# Patient Record
Sex: MALE | Race: OTHER | URBAN - METROPOLITAN AREA
[De-identification: names, ages, dates, MRNs, and addresses within clinical notes are randomized per-mention and may not be internally consistent; named-entity substitution may affect disease eponyms.]

---

## 2020-06-26 ENCOUNTER — APPOINTMENT (RX ONLY)
Dept: URBAN - METROPOLITAN AREA CLINIC 4 | Facility: CLINIC | Age: 75
Setting detail: DERMATOLOGY
End: 2020-06-26

## 2020-06-26 PROBLEM — C44.92 SQUAMOUS CELL CARCINOMA OF SKIN, UNSPECIFIED: Status: ACTIVE | Noted: 2020-06-26

## 2020-06-26 PROCEDURE — ? MOHS SURGERY PHONE CONSULTATION

## 2020-06-26 NOTE — PROCEDURE: MOHS SURGERY PHONE CONSULTATION
Time Of Mohs Surgery: 9am
Does The Patient Have A Pacemaker Or Defibrillator?: No
Pathology Accession #: [id number]
Patient Preferred Phone Number: [phone]
Which Antibiotic Do They Take For Surgical Prophylaxis?: Amoxicillin (2 grams)
Date Of Mohs Surgery: 1945
Patient's Insurance: 
Referring Provider: Bernardino Eid
Detail Level: Simple
Office Location Of Mohs Surgery: [facility]
Patient Reported Location: right jawline
Has The Pathology Report Been Received?: Yes

## 2020-07-02 ENCOUNTER — APPOINTMENT (RX ONLY)
Dept: URBAN - METROPOLITAN AREA CLINIC 22 | Facility: CLINIC | Age: 75
Setting detail: DERMATOLOGY
End: 2020-07-02

## 2020-07-02 PROBLEM — C44.329 SQUAMOUS CELL CARCINOMA OF SKIN OF OTHER PARTS OF FACE: Status: ACTIVE | Noted: 2020-07-02

## 2020-07-02 PROCEDURE — 14040 TIS TRNFR F/C/C/M/N/A/G/H/F: CPT

## 2020-07-02 PROCEDURE — ? MOHS SURGERY

## 2020-07-02 PROCEDURE — ? DIAGNOSIS COMMENT

## 2020-07-02 PROCEDURE — 17311 MOHS 1 STAGE H/N/HF/G: CPT

## 2020-07-02 PROCEDURE — ? REFERRAL CORRESPONDENCE

## 2020-07-02 PROCEDURE — 17312 MOHS ADDL STAGE: CPT

## 2020-07-02 NOTE — HPI: PROCEDURE (MOHS)
Has The Growth Been Previously Biopsied?: has been previously biopsied
Body Location Override (Optional): Right Jawline
Year Removed: 20 years ago
Location: Left upper arm

## 2020-07-02 NOTE — PROCEDURE: MOHS SURGERY
Body Location Override (Optional - Billing Will Still Be Based On Selected Body Map Location If Applicable): right jawline
Mohs Case Number: [id number]
Previous Accession (Optional): [id number]
Biopsy Photograph Reviewed: Yes
Referring Physician (Optional): Bernardino Eid
Consent Type: Consent 1 (Standard)
Eye Shield Used: No
Surgeon Performing Repair (Optional): Hussein Teague M.D.
Initial Size Of Lesion: 0.9
Number Of Stages: 2
Primary Defect Length In Cm (Final Defect Size - Required For Flaps/Grafts): 1.8
Primary Defect Width In Cm (Final Defect Size - Required For Flaps/Grafts): 1.5
Repair Type: Flap
Oculoplastic Surgeon Procedure Text (A): After obtaining clear surgical margins the patient was sent to oculoplastics for surgical repair.  The patient understands they will receive post-surgical care and follow-up from the referring physician's office.
Otolaryngologist Procedure Text (A): After obtaining clear surgical margins the patient was sent to otolaryngology for surgical repair.  The patient understands they will receive post-surgical care and follow-up from the referring physician's office.
Plastic Surgeon Procedure Text (A): After obtaining clear surgical margins the patient was sent to plastics for surgical repair.  The patient understands they will receive post-surgical care and follow-up from the referring physician's office.
Mid-Level Procedure Text (A): After obtaining clear surgical margins the patient was sent to a mid-level provider for surgical repair.  The patient understands they will receive post-surgical care and follow-up from the mid-level provider.
Provider Procedure Text (A): After obtaining clear surgical margins the defect was repaired by another provider.
Asc Procedure Text (A): After obtaining clear surgical margins the patient was sent to an ASC for surgical repair.  The patient understands they will receive post-surgical care and follow-up from the ASC physician.
Suturegard Retention Suture: 2-0 Nylon
Retention Suture Bite Size: 3 mm
Length To Time In Minutes Device Was In Place: 10
Number Of Hemigard Strips Per Side: 1
Simple / Intermediate / Complex Repair - Final Wound Length In Cm: 0
Undermining Type: Entire Wound
Debridement Text: The wound edges were debrided prior to proceeding with the closure to facilitate wound healing.
Helical Rim Text: The closure involved the helical rim.
Vermilion Border Text: The closure involved the vermilion border.
Nostril Rim Text: The closure involved the nostril rim.
Retention Suture Text: Retention sutures were placed to support the closure and prevent dehiscence.
Flap Type: A-T Advancement Flap
Secondary Defect Width In Cm (Required For Flaps): 0.2
Area H Indication Text: Tumors in this location are included in Area H (eyelids, eyebrows, nose, lips, chin, ear, pre-auricular, post-auricular, temple, genitalia, hands, feet, ankles and areola).  Tissue conservation is critical in these anatomic locations.
Area M Indication Text: Tumors in this location are included in Area M (cheek, forehead, scalp, neck, jawline and pretibial skin).  Mohs surgery is indicated for tumors in these anatomic locations.
Area L Indication Text: Tumors in this location are included in Area L (trunk and extremities).  Mohs surgery is indicated for larger tumors, or tumors with aggressive histologic features, in these anatomic locations.
Tumor Debulked?: curette
Depth Of Tumor Invasion (For Histology): epidermis
Surgical Defect Width In Cm (Optional): 1.4
Special Stains Stage 1 - Results: Base On Clearance Noted Above
Stage 2: Additional Anesthesia Type: 1% lidocaine with epinephrine and a 1:10 solution of 8.4% sodium bicarbonate
Stage 4: Additional Anesthesia Type: 1% lidocaine with epinephrine
Include Tumor Staging In Mohs Note?: Please Select the Appropriate Response
Staging Info: By selecting yes to the question above you will include information on AJCC 8 tumor staging in your Mohs note. Information on tumor staging will be automatically added for SCCs on the head and neck. AJCC 8 includes tumor size, tumor depth, perineural involvement and bone invasion.
Tumor Depth: Less than 6mm from granular layer and no invasion beyond the subcutaneous fat
Medical Necessity Statement: Based on my medical judgement, Mohs surgery is the most appropriate treatment for this cancer compared to other treatments.
Alternatives Discussed Intro (Do Not Add Period): I discussed alternative treatments to Mohs surgery and specifically discussed the risks and benefits of
Consent 1/Introductory Paragraph: The rationale for Mohs was explained to the patient and consent was obtained. The risks, benefits and alternatives to therapy were discussed in detail. Specifically, the risks of infection, scarring, bleeding, prolonged wound healing, incomplete removal, allergy to anesthesia, nerve injury and recurrence were addressed. Prior to the procedure, the treatment site was clearly identified and confirmed by the patient. All components of Universal Protocol/PAUSE Rule completed.
Consent 2/Introductory Paragraph: Mohs surgery was explained to the patient and consent was obtained. The risks, benefits and alternatives to therapy were discussed in detail. Specifically, the risks of infection, scarring, bleeding, prolonged wound healing, incomplete removal, allergy to anesthesia, nerve injury and recurrence were addressed. Prior to the procedure, the treatment site was clearly identified and confirmed by the patient. All components of Universal Protocol/PAUSE Rule completed.
Consent 3/Introductory Paragraph: I gave the patient a chance to ask questions they had about the procedure.  Following this I explained the Mohs procedure and consent was obtained. The risks, benefits and alternatives to therapy were discussed in detail. Specifically, the risks of infection, scarring, bleeding, prolonged wound healing, incomplete removal, allergy to anesthesia, nerve injury and recurrence were addressed. Prior to the procedure, the treatment site was clearly identified and confirmed by the patient. All components of Universal Protocol/PAUSE Rule completed.
Consent (Temporal Branch)/Introductory Paragraph: The rationale for Mohs was explained to the patient and consent was obtained. The risks, benefits and alternatives to therapy were discussed in detail. Specifically, the risks of damage to the temporal branch of the facial nerve, infection, scarring, bleeding, prolonged wound healing, incomplete removal, allergy to anesthesia, and recurrence were addressed. Prior to the procedure, the treatment site was clearly identified and confirmed by the patient. All components of Universal Protocol/PAUSE Rule completed.
Consent (Marginal Mandibular)/Introductory Paragraph: The rationale for Mohs was explained to the patient and consent was obtained. The risks, benefits and alternatives to therapy were discussed in detail. Specifically, the risks of damage to the marginal mandibular branch of the facial nerve, infection, scarring, bleeding, prolonged wound healing, incomplete removal, allergy to anesthesia, and recurrence were addressed. Prior to the procedure, the treatment site was clearly identified and confirmed by the patient. All components of Universal Protocol/PAUSE Rule completed.
Consent (Spinal Accessory)/Introductory Paragraph: The rationale for Mohs was explained to the patient and consent was obtained. The risks, benefits and alternatives to therapy were discussed in detail. Specifically, the risks of damage to the spinal accessory nerve, infection, scarring, bleeding, prolonged wound healing, incomplete removal, allergy to anesthesia, and recurrence were addressed. Prior to the procedure, the treatment site was clearly identified and confirmed by the patient. All components of Universal Protocol/PAUSE Rule completed.
Consent (Near Eyelid Margin)/Introductory Paragraph: The rationale for Mohs was explained to the patient and consent was obtained. The risks, benefits and alternatives to therapy were discussed in detail. Specifically, the risks of ectropion or eyelid deformity, infection, scarring, bleeding, prolonged wound healing, incomplete removal, allergy to anesthesia, nerve injury and recurrence were addressed. Prior to the procedure, the treatment site was clearly identified and confirmed by the patient. All components of Universal Protocol/PAUSE Rule completed.
Consent (Ear)/Introductory Paragraph: The rationale for Mohs was explained to the patient and consent was obtained. The risks, benefits and alternatives to therapy were discussed in detail. Specifically, the risks of ear deformity, infection, scarring, bleeding, prolonged wound healing, incomplete removal, allergy to anesthesia, nerve injury and recurrence were addressed. Prior to the procedure, the treatment site was clearly identified and confirmed by the patient. All components of Universal Protocol/PAUSE Rule completed.
Consent (Nose)/Introductory Paragraph: The rationale for Mohs was explained to the patient and consent was obtained. The risks, benefits and alternatives to therapy were discussed in detail. Specifically, the risks of nasal deformity, changes in the flow of air through the nose, infection, scarring, bleeding, prolonged wound healing, incomplete removal, allergy to anesthesia, nerve injury and recurrence were addressed. Prior to the procedure, the treatment site was clearly identified and confirmed by the patient. All components of Universal Protocol/PAUSE Rule completed.
Consent (Lip)/Introductory Paragraph: The rationale for Mohs was explained to the patient and consent was obtained. The risks, benefits and alternatives to therapy were discussed in detail. Specifically, the risks of lip deformity, changes in the oral aperture, infection, scarring, bleeding, prolonged wound healing, incomplete removal, allergy to anesthesia, nerve injury and recurrence were addressed. Prior to the procedure, the treatment site was clearly identified and confirmed by the patient. All components of Universal Protocol/PAUSE Rule completed.
Consent (Scalp)/Introductory Paragraph: The rationale for Mohs was explained to the patient and consent was obtained. The risks, benefits and alternatives to therapy were discussed in detail. Specifically, the risks of changes in hair growth pattern secondary to repair, infection, scarring, bleeding, prolonged wound healing, incomplete removal, allergy to anesthesia, nerve injury and recurrence were addressed. Prior to the procedure, the treatment site was clearly identified and confirmed by the patient. All components of Universal Protocol/PAUSE Rule completed.
Detail Level: Detailed
Postop Diagnosis: same
Anesthesia Volume In Cc: 12
Additional Anesthesia Volume In Cc: 6
Hemostasis: Electricator
Estimated Blood Loss (Cc): minimal
Repair Anesthesia Method: local infiltration
Anesthesia Volume In Cc: 3
Undermining Location (Optional): in the deep fat
Deep Sutures: 4-0 Maxon
Epidermal Sutures: 5-0 Surgipro
Epidermal Closure: running and interrupted
Suturegard Intro: Intraoperative tissue expansion was performed, utilizing the SUTUREGARD device, in order to reduce wound tension.
Suturegard Body: The suture ends were repeatedly re-tightened and re-clamped to achieve the desired tissue expansion.
Hemigard Intro: Due to skin fragility and wound tension, it was decided to use HEMIGARD adhesive retention suture devices to permit a linear closure. The skin was cleaned and dried for a 6cm distance away from the wound. Excessive hair, if present, was removed to allow for adhesion.
Hemigard Postcare Instructions: The HEMIGARD strips are to remain completely dry for at least 5-7 days.
Donor Site Anesthesia Type: same as repair anesthesia
Epidermal Closure Graft Donor Site (Optional): simple interrupted
Graft Donor Site Bandage (Optional-Leave Blank If You Don't Want In Note): Steri-strips and a pressure bandage were applied to the donor site.
Closure 2 Information: This tab is for additional flaps and grafts, including complex repair and grafts and complex repair and flaps. You can also specify a different location for the additional defect, if the location is the same you do not need to select a new one. We will insert the automated text for the repair you select below just as we do for solitary flaps and grafts. Please note that at this time if you select a location with a different insurance zone you will need to override the ICD10 and CPT if appropriate.
Closure 3 Information: This tab is for additional flaps and grafts above and beyond our usual structured repairs.  Please note if you enter information here it will not currently bill and you will need to add the billing information manually.
Wound Care: Petrolatum
Dressing: pressure dressing with telfa
Dressing (No Sutures): dry sterile dressing
Suture Removal: 7 days
Unna Boot Text: An Unna boot was placed to help immobilize the limb and facilitate more rapid healing.
Home Suture Removal Text: Patient was provided instructions on removing sutures and will remove their sutures at home.  If they have any questions or difficulties they will call the office.
Post-Care Instructions: I reviewed with the patient in detail post-care instructions. Patient is not to engage in any heavy lifting, exercise, or swimming for the next 14 days. Should the patient develop any fevers, chills, bleeding, severe pain patient will contact the office immediately.
Pain Refusal Text: I offered to prescribe pain medication but the patient refused to take this medication.
Mauc Instructions: By selecting yes to the question below the MAUC number will be added into the note.  This will be calculated automatically based on the diagnosis chosen, the size entered, the body zone selected (H,M,L) and the specific indications you chose. You will also have the option to override the Mohs AUC if you disagree with the automatically calculated number and this option is found in the Case Summary tab.
Where Do You Want The Question To Include Opioid Counseling Located?: Case Summary Tab
Eye Protection Verbiage: Before proceeding with the stage, a plastic scleral shield was inserted. The globe was anesthetized with a few drops of 1% lidocaine with 1:100,000 epinephrine. Then, an appropriate sized scleral shield was chosen and coated with lacrilube ointment. The shield was gently inserted and left in place for the duration of each stage. After the stage was completed, the shield was gently removed.
Mohs Method Verbiage: An incision at a 45 degree angle following the standard Mohs approach was done and the specimen was harvested as a microscopic controlled layer.
Surgeon/Pathologist Verbiage (Will Incorporate Name Of Surgeon From Intro If Not Blank): operated in two distinct and integrated capacities as the surgeon and pathologist.
Mohs Histo Method Verbiage: Each section was then chromacoded and processed in the Mohs lab using the Mohs protocol and submitted for frozen section.
Subsequent Stages Histo Method Verbiage: Using a similar technique to that described above, a thin layer of tissue was removed from all areas where tumor was visible on the previous stage.  The tissue was again oriented, mapped, dyed, and processed as above.
Mohs Rapid Report Verbiage: The area of clinically evident tumor was marked with skin marking ink and appropriately hatched.  The initial incision was made following the Mohs approach through the skin.  The specimen was taken to the lab, divided into the necessary number of pieces, chromacoded and processed according to the Mohs protocol.  This was repeated in successive stages until a tumor free defect was achieved.
Complex Repair Preamble Text (Leave Blank If You Do Not Want): Extensive wide undermining was performed.
Intermediate Repair Preamble Text (Leave Blank If You Do Not Want): Undermining was performed with blunt dissection.
Non-Graft Cartilage Fenestration Text: The cartilage was fenestrated with a 2mm punch biopsy to help facilitate healing.
Graft Cartilage Fenestration Text: The cartilage was fenestrated with a 2mm punch biopsy to help facilitate graft survival and healing.
Secondary Intention Text (Leave Blank If You Do Not Want): The defect will heal with secondary intention.
No Repair - Repaired With Adjacent Surgical Defect Text (Leave Blank If You Do Not Want): After obtaining clear surgical margins the defect was repaired concurrently with another surgical defect which was in close approximation.
Advancement Flap (Single) Text: The defect edges were debeveled with a #15 scalpel blade.  Given the location of the defect and the proximity to free margins a single advancement flap was deemed most appropriate.  Using a sterile surgical marker, an appropriate advancement flap was drawn incorporating the defect and placing the expected incisions within the relaxed skin tension lines where possible.    The area thus outlined was incised deep to adipose tissue with a #15 scalpel blade.  The skin margins were undermined to an appropriate distance in all directions utilizing iris scissors.
Advancement Flap (Double) Text: The defect edges were debeveled with a #15 scalpel blade.  Given the location of the defect and the proximity to free margins a double advancement flap was deemed most appropriate.  Using a sterile surgical marker, the appropriate advancement flaps were drawn incorporating the defect and placing the expected incisions within the relaxed skin tension lines where possible.    The area thus outlined was incised deep to adipose tissue with a #15 scalpel blade.  The skin margins were undermined to an appropriate distance in all directions utilizing iris scissors.
Burow's Advancement Flap Text: The defect edges were debeveled with a #15 scalpel blade.  Given the location of the defect and the proximity to free margins a Burow's advancement flap was deemed most appropriate.  Using a sterile surgical marker, the appropriate advancement flap was drawn incorporating the defect and placing the expected incisions within the relaxed skin tension lines where possible.    The area thus outlined was incised deep to adipose tissue with a #15 scalpel blade.  The skin margins were undermined to an appropriate distance in all directions utilizing iris scissors.
Chonodrocutaneous Helical Advancement Flap Text: The defect edges were debeveled with a #15 scalpel blade.  Given the location of the defect and the proximity to free margins a chondrocutaneous helical advancement flap was deemed most appropriate.  Using a sterile surgical marker, the appropriate advancement flap was drawn incorporating the defect and placing the expected incisions within the relaxed skin tension lines where possible.    The area thus outlined was incised deep to adipose tissue with a #15 scalpel blade.  The skin margins were undermined to an appropriate distance in all directions utilizing iris scissors.
Crescentic Advancement Flap Text: The defect edges were debeveled with a #15 scalpel blade.  Given the location of the defect and the proximity to free margins a crescentic advancement flap was deemed most appropriate.  Using a sterile surgical marker, the appropriate advancement flap was drawn incorporating the defect and placing the expected incisions within the relaxed skin tension lines where possible.    The area thus outlined was incised deep to adipose tissue with a #15 scalpel blade.  The skin margins were undermined to an appropriate distance in all directions utilizing iris scissors.
A-T Advancement Flap Text: The defect edges were debeveled with a #15 scalpel blade.  Given the location of the defect, shape of the defect and the proximity to free margins an A-T advancement flap was deemed most appropriate.  Using a sterile surgical marker, an appropriate advancement flap was drawn incorporating the defect and placing the expected incisions within the relaxed skin tension lines where possible.    The area thus outlined was incised deep to adipose tissue with a #15 scalpel blade.  The skin margins were undermined to an appropriate distance in all directions utilizing iris scissors.
O-T Advancement Flap Text: The defect edges were debeveled with a #15 scalpel blade.  Given the location of the defect, shape of the defect and the proximity to free margins an O-T advancement flap was deemed most appropriate.  Using a sterile surgical marker, an appropriate advancement flap was drawn incorporating the defect and placing the expected incisions within the relaxed skin tension lines where possible.    The area thus outlined was incised deep to adipose tissue with a #15 scalpel blade.  The skin margins were undermined to an appropriate distance in all directions utilizing iris scissors.
O-L Flap Text: The defect edges were debeveled with a #15 scalpel blade.  Given the location of the defect, shape of the defect and the proximity to free margins an O-L flap was deemed most appropriate.  Using a sterile surgical marker, an appropriate advancement flap was drawn incorporating the defect and placing the expected incisions within the relaxed skin tension lines where possible.    The area thus outlined was incised deep to adipose tissue with a #15 scalpel blade.  The skin margins were undermined to an appropriate distance in all directions utilizing iris scissors.
O-Z Flap Text: The defect edges were debeveled with a #15 scalpel blade.  Given the location of the defect, shape of the defect and the proximity to free margins an O-Z flap was deemed most appropriate.  Using a sterile surgical marker, an appropriate transposition flap was drawn incorporating the defect and placing the expected incisions within the relaxed skin tension lines where possible. The area thus outlined was incised deep to adipose tissue with a #15 scalpel blade.  The skin margins were undermined to an appropriate distance in all directions utilizing iris scissors.
Double O-Z Flap Text: The defect edges were debeveled with a #15 scalpel blade.  Given the location of the defect, shape of the defect and the proximity to free margins a Double O-Z flap was deemed most appropriate.  Using a sterile surgical marker, an appropriate transposition flap was drawn incorporating the defect and placing the expected incisions within the relaxed skin tension lines where possible. The area thus outlined was incised deep to adipose tissue with a #15 scalpel blade.  The skin margins were undermined to an appropriate distance in all directions utilizing iris scissors.
V-Y Flap Text: The defect edges were debeveled with a #15 scalpel blade.  Given the location of the defect, shape of the defect and the proximity to free margins a V-Y flap was deemed most appropriate.  Using a sterile surgical marker, an appropriate advancement flap was drawn incorporating the defect and placing the expected incisions within the relaxed skin tension lines where possible.    The area thus outlined was incised deep to adipose tissue with a #15 scalpel blade.  The skin margins were undermined to an appropriate distance in all directions utilizing iris scissors.
Advancement-Rotation Flap Text: The defect edges were debeveled with a #15 scalpel blade.  Given the location of the defect, shape of the defect and the proximity to free margins an advancement-rotation flap was deemed most appropriate.  Using a sterile surgical marker, an appropriate flap was drawn incorporating the defect and placing the expected incisions within the relaxed skin tension lines where possible. The area thus outlined was incised deep to adipose tissue with a #15 scalpel blade.  The skin margins were undermined to an appropriate distance in all directions utilizing iris scissors.
Mercedes Flap Text: The defect edges were debeveled with a #15 scalpel blade.  Given the location of the defect, shape of the defect and the proximity to free margins a Mercedes flap was deemed most appropriate.  Using a sterile surgical marker, an appropriate advancement flap was drawn incorporating the defect and placing the expected incisions within the relaxed skin tension lines where possible. The area thus outlined was incised deep to adipose tissue with a #15 scalpel blade.  The skin margins were undermined to an appropriate distance in all directions utilizing iris scissors.
Modified Advancement Flap Text: The defect edges were debeveled with a #15 scalpel blade.  Given the location of the defect, shape of the defect and the proximity to free margins a modified advancement flap was deemed most appropriate.  Using a sterile surgical marker, an appropriate advancement flap was drawn incorporating the defect and placing the expected incisions within the relaxed skin tension lines where possible.    The area thus outlined was incised deep to adipose tissue with a #15 scalpel blade.  The skin margins were undermined to an appropriate distance in all directions utilizing iris scissors.
Mucosal Advancement Flap Text: Given the location of the defect, shape of the defect and the proximity to free margins a mucosal advancement flap was deemed most appropriate. Incisions were made with a 15 blade scalpel in the appropriate fashion along the cutaneous vermilion border and the mucosal lip. The remaining actinically damaged mucosal tissue was excised.  The mucosal advancement flap was then elevated to the gingival sulcus with care taken to preserve the neurovascular structures and advanced into the primary defect. Care was taken to ensure that precise realignment of the vermilion border was achieved.
Peng Advancement Flap Text: The defect edges were debeveled with a #15 scalpel blade.  Given the location of the defect, shape of the defect and the proximity to free margins a Peng advancement flap was deemed most appropriate.  Using a sterile surgical marker, an appropriate advancement flap was drawn incorporating the defect and placing the expected incisions within the relaxed skin tension lines where possible. The area thus outlined was incised deep to adipose tissue with a #15 scalpel blade.  The skin margins were undermined to an appropriate distance in all directions utilizing iris scissors.
Hatchet Flap Text: The defect edges were debeveled with a #15 scalpel blade.  Given the location of the defect, shape of the defect and the proximity to free margins a hatchet flap was deemed most appropriate.  Using a sterile surgical marker, an appropriate hatchet flap was drawn incorporating the defect and placing the expected incisions within the relaxed skin tension lines where possible.    The area thus outlined was incised deep to adipose tissue with a #15 scalpel blade.  The skin margins were undermined to an appropriate distance in all directions utilizing iris scissors.
Rotation Flap Text: The defect edges were debeveled with a #15 scalpel blade.  Given the location of the defect, shape of the defect and the proximity to free margins a rotation flap was deemed most appropriate.  Using a sterile surgical marker, an appropriate rotation flap was drawn incorporating the defect and placing the expected incisions within the relaxed skin tension lines where possible.    The area thus outlined was incised deep to adipose tissue with a #15 scalpel blade.  The skin margins were undermined to an appropriate distance in all directions utilizing iris scissors.
Spiral Flap Text: The defect edges were debeveled with a #15 scalpel blade.  Given the location of the defect, shape of the defect and the proximity to free margins a spiral flap was deemed most appropriate.  Using a sterile surgical marker, an appropriate rotation flap was drawn incorporating the defect and placing the expected incisions within the relaxed skin tension lines where possible. The area thus outlined was incised deep to adipose tissue with a #15 scalpel blade.  The skin margins were undermined to an appropriate distance in all directions utilizing iris scissors.
Star Wedge Flap Text: The defect edges were debeveled with a #15 scalpel blade.  Given the location of the defect, shape of the defect and the proximity to free margins a star wedge flap was deemed most appropriate.  Using a sterile surgical marker, an appropriate rotation flap was drawn incorporating the defect and placing the expected incisions within the relaxed skin tension lines where possible. The area thus outlined was incised deep to adipose tissue with a #15 scalpel blade.  The skin margins were undermined to an appropriate distance in all directions utilizing iris scissors.
Transposition Flap Text: The defect edges were debeveled with a #15 scalpel blade.  Given the location of the defect and the proximity to free margins a transposition flap was deemed most appropriate.  Using a sterile surgical marker, an appropriate transposition flap was drawn incorporating the defect.    The area thus outlined was incised deep to adipose tissue with a #15 scalpel blade.  The skin margins were undermined to an appropriate distance in all directions utilizing iris scissors.
Muscle Hinge Flap Text: The defect edges were debeveled with a #15 scalpel blade.  Given the size, depth and location of the defect and the proximity to free margins a muscle hinge flap was deemed most appropriate.  Using a sterile surgical marker, an appropriate hinge flap was drawn incorporating the defect. The area thus outlined was incised with a #15 scalpel blade.  The skin margins were undermined to an appropriate distance in all directions utilizing iris scissors.
Melolabial Transposition Flap Text: The defect edges were debeveled with a #15 scalpel blade.  Given the location of the defect and the proximity to free margins a melolabial flap was deemed most appropriate.  Using a sterile surgical marker, an appropriate melolabial transposition flap was drawn incorporating the defect.    The area thus outlined was incised deep to adipose tissue with a #15 scalpel blade.  The skin margins were undermined to an appropriate distance in all directions utilizing iris scissors.
Rhombic Flap Text: The defect edges were debeveled with a #15 scalpel blade.  Given the location of the defect and the proximity to free margins a rhombic flap was deemed most appropriate.  Using a sterile surgical marker, an appropriate rhombic flap was drawn incorporating the defect.    The area thus outlined was incised deep to adipose tissue with a #15 scalpel blade.  The skin margins were undermined to an appropriate distance in all directions utilizing iris scissors.
Rhomboid Transposition Flap Text: The defect edges were debeveled with a #15 scalpel blade.  Given the location of the defect and the proximity to free margins a rhomboid transposition flap was deemed most appropriate.  Using a sterile surgical marker, an appropriate rhomboid flap was drawn incorporating the defect.    The area thus outlined was incised deep to adipose tissue with a #15 scalpel blade.  The skin margins were undermined to an appropriate distance in all directions utilizing iris scissors.
Bi-Rhombic Flap Text: The defect edges were debeveled with a #15 scalpel blade.  Given the location of the defect and the proximity to free margins a bi-rhombic flap was deemed most appropriate.  Using a sterile surgical marker, an appropriate rhombic flap was drawn incorporating the defect. The area thus outlined was incised deep to adipose tissue with a #15 scalpel blade.  The skin margins were undermined to an appropriate distance in all directions utilizing iris scissors.
Helical Rim Advancement Flap Text: The defect edges were debeveled with a #15 blade scalpel.  Given the location of the defect and the proximity to free margins (helical rim) a double helical rim advancement flap was deemed most appropriate.  Using a sterile surgical marker, the appropriate advancement flaps were drawn incorporating the defect and placing the expected incisions between the helical rim and antihelix where possible.  The area thus outlined was incised through and through with a #15 scalpel blade.  With a skin hook and iris scissors, the flaps were gently and sharply undermined and freed up.
Bilateral Helical Rim Advancement Flap Text: The defect edges were debeveled with a #15 blade scalpel.  Given the location of the defect and the proximity to free margins (helical rim) a bilateral helical rim advancement flap was deemed most appropriate.  Using a sterile surgical marker, the appropriate advancement flaps were drawn incorporating the defect and placing the expected incisions between the helical rim and antihelix where possible.  The area thus outlined was incised through and through with a #15 scalpel blade.  With a skin hook and iris scissors, the flaps were gently and sharply undermined and freed up.
Ear Star Wedge Flap Text: The defect edges were debeveled with a #15 blade scalpel.  Given the location of the defect and the proximity to free margins (helical rim) an ear star wedge flap was deemed most appropriate.  Using a sterile surgical marker, the appropriate flap was drawn incorporating the defect and placing the expected incisions between the helical rim and antihelix where possible.  The area thus outlined was incised through and through with a #15 scalpel blade.
Banner Transposition Flap Text: The defect edges were debeveled with a #15 scalpel blade.  Given the location of the defect and the proximity to free margins a Banner transposition flap was deemed most appropriate.  Using a sterile surgical marker, an appropriate flap drawn around the defect. The area thus outlined was incised deep to adipose tissue with a #15 scalpel blade.  The skin margins were undermined to an appropriate distance in all directions utilizing iris scissors.
Bilobed Flap Text: The defect edges were debeveled with a #15 scalpel blade.  Given the location of the defect and the proximity to free margins a bilobe flap was deemed most appropriate.  Using a sterile surgical marker, an appropriate bilobe flap drawn around the defect.    The area thus outlined was incised deep to adipose tissue with a #15 scalpel blade.  The skin margins were undermined to an appropriate distance in all directions utilizing iris scissors.
Bilobed Transposition Flap Text: The defect edges were debeveled with a #15 scalpel blade.  Given the location of the defect and the proximity to free margins a bilobed transposition flap was deemed most appropriate.  Using a sterile surgical marker, an appropriate bilobe flap drawn around the defect.    The area thus outlined was incised deep to adipose tissue with a #15 scalpel blade.  The skin margins were undermined to an appropriate distance in all directions utilizing iris scissors.
Trilobed Flap Text: The defect edges were debeveled with a #15 scalpel blade.  Given the location of the defect and the proximity to free margins a trilobed flap was deemed most appropriate.  Using a sterile surgical marker, an appropriate trilobed flap drawn around the defect.    The area thus outlined was incised deep to adipose tissue with a #15 scalpel blade.  The skin margins were undermined to an appropriate distance in all directions utilizing iris scissors.
Dorsal Nasal Flap Text: The defect edges were debeveled with a #15 scalpel blade.  Given the location of the defect and the proximity to free margins a dorsal nasal flap was deemed most appropriate.  Using a sterile surgical marker, an appropriate dorsal nasal flap was drawn around the defect.    The area thus outlined was incised deep to adipose tissue with a #15 scalpel blade.  The skin margins were undermined to an appropriate distance in all directions utilizing iris scissors.
Island Pedicle Flap Text: The defect edges were debeveled with a #15 scalpel blade.  Given the location of the defect, shape of the defect and the proximity to free margins an island pedicle advancement flap was deemed most appropriate.  Using a sterile surgical marker, an appropriate advancement flap was drawn incorporating the defect, outlining the appropriate donor tissue and placing the expected incisions within the relaxed skin tension lines where possible.    The area thus outlined was incised deep to adipose tissue with a #15 scalpel blade.  The skin margins were undermined to an appropriate distance in all directions around the primary defect and laterally outward around the island pedicle utilizing iris scissors.  There was minimal undermining beneath the pedicle flap.
Island Pedicle Flap With Canthal Suspension Text: The defect edges were debeveled with a #15 scalpel blade.  Given the location of the defect, shape of the defect and the proximity to free margins an island pedicle advancement flap was deemed most appropriate.  Using a sterile surgical marker, an appropriate advancement flap was drawn incorporating the defect, outlining the appropriate donor tissue and placing the expected incisions within the relaxed skin tension lines where possible. The area thus outlined was incised deep to adipose tissue with a #15 scalpel blade.  The skin margins were undermined to an appropriate distance in all directions around the primary defect and laterally outward around the island pedicle utilizing iris scissors.  There was minimal undermining beneath the pedicle flap. A suspension suture was placed in the canthal tendon to prevent tension and prevent ectropion.
Alar Island Pedicle Flap Text: The defect edges were debeveled with a #15 scalpel blade.  Given the location of the defect, shape of the defect and the proximity to the alar rim an island pedicle advancement flap was deemed most appropriate.  Using a sterile surgical marker, an appropriate advancement flap was drawn incorporating the defect, outlining the appropriate donor tissue and placing the expected incisions within the nasal ala running parallel to the alar rim. The area thus outlined was incised with a #15 scalpel blade.  The skin margins were undermined minimally to an appropriate distance in all directions around the primary defect and laterally outward around the island pedicle utilizing iris scissors.  There was minimal undermining beneath the pedicle flap.
Double Island Pedicle Flap Text: The defect edges were debeveled with a #15 scalpel blade.  Given the location of the defect, shape of the defect and the proximity to free margins a double island pedicle advancement flap was deemed most appropriate.  Using a sterile surgical marker, an appropriate advancement flap was drawn incorporating the defect, outlining the appropriate donor tissue and placing the expected incisions within the relaxed skin tension lines where possible.    The area thus outlined was incised deep to adipose tissue with a #15 scalpel blade.  The skin margins were undermined to an appropriate distance in all directions around the primary defect and laterally outward around the island pedicle utilizing iris scissors.  There was minimal undermining beneath the pedicle flap.
Island Pedicle Flap-Requiring Vessel Identification Text: The defect edges were debeveled with a #15 scalpel blade.  Given the location of the defect, shape of the defect and the proximity to free margins an island pedicle advancement flap was deemed most appropriate.  Using a sterile surgical marker, an appropriate advancement flap was drawn, based on the axial vessel mentioned above, incorporating the defect, outlining the appropriate donor tissue and placing the expected incisions within the relaxed skin tension lines where possible.    The area thus outlined was incised deep to adipose tissue with a #15 scalpel blade.  The skin margins were undermined to an appropriate distance in all directions around the primary defect and laterally outward around the island pedicle utilizing iris scissors.  There was minimal undermining beneath the pedicle flap.
Keystone Flap Text: The defect edges were debeveled with a #15 scalpel blade.  Given the location of the defect, shape of the defect a keystone flap was deemed most appropriate.  Using a sterile surgical marker, an appropriate keystone flap was drawn incorporating the defect, outlining the appropriate donor tissue and placing the expected incisions within the relaxed skin tension lines where possible. The area thus outlined was incised deep to adipose tissue with a #15 scalpel blade.  The skin margins were undermined to an appropriate distance in all directions around the primary defect and laterally outward around the flap utilizing iris scissors.
O-T Plasty Text: The defect edges were debeveled with a #15 scalpel blade.  Given the location of the defect, shape of the defect and the proximity to free margins an O-T plasty was deemed most appropriate.  Using a sterile surgical marker, an appropriate O-T plasty was drawn incorporating the defect and placing the expected incisions within the relaxed skin tension lines where possible.    The area thus outlined was incised deep to adipose tissue with a #15 scalpel blade.  The skin margins were undermined to an appropriate distance in all directions utilizing iris scissors.
O-Z Plasty Text: The defect edges were debeveled with a #15 scalpel blade.  Given the location of the defect, shape of the defect and the proximity to free margins an O-Z plasty (double transposition flap) was deemed most appropriate.  Using a sterile surgical marker, the appropriate transposition flaps were drawn incorporating the defect and placing the expected incisions within the relaxed skin tension lines where possible.    The area thus outlined was incised deep to adipose tissue with a #15 scalpel blade.  The skin margins were undermined to an appropriate distance in all directions utilizing iris scissors.  Hemostasis was achieved with electrocautery.  The flaps were then transposed into place, one clockwise and the other counterclockwise, and anchored with interrupted buried subcutaneous sutures.
Double O-Z Plasty Text: The defect edges were debeveled with a #15 scalpel blade.  Given the location of the defect, shape of the defect and the proximity to free margins a Double O-Z plasty (double transposition flap) was deemed most appropriate.  Using a sterile surgical marker, the appropriate transposition flaps were drawn incorporating the defect and placing the expected incisions within the relaxed skin tension lines where possible. The area thus outlined was incised deep to adipose tissue with a #15 scalpel blade.  The skin margins were undermined to an appropriate distance in all directions utilizing iris scissors.  Hemostasis was achieved with electrocautery.  The flaps were then transposed into place, one clockwise and the other counterclockwise, and anchored with interrupted buried subcutaneous sutures.
V-Y Plasty Text: The defect edges were debeveled with a #15 scalpel blade.  Given the location of the defect, shape of the defect and the proximity to free margins an V-Y advancement flap was deemed most appropriate.  Using a sterile surgical marker, an appropriate advancement flap was drawn incorporating the defect and placing the expected incisions within the relaxed skin tension lines where possible.    The area thus outlined was incised deep to adipose tissue with a #15 scalpel blade.  The skin margins were undermined to an appropriate distance in all directions utilizing iris scissors.
H Plasty Text: Given the location of the defect, shape of the defect and the proximity to free margins a H-plasty was deemed most appropriate for repair.  Using a sterile surgical marker, the appropriate advancement arms of the H-plasty were drawn incorporating the defect and placing the expected incisions within the relaxed skin tension lines where possible. The area thus outlined was incised deep to adipose tissue with a #15 scalpel blade. The skin margins were undermined to an appropriate distance in all directions utilizing iris scissors.  The opposing advancement arms were then advanced into place in opposite direction and anchored with interrupted buried subcutaneous sutures.
W Plasty Text: The lesion was extirpated to the level of the fat with a #15 scalpel blade.  Given the location of the defect, shape of the defect and the proximity to free margins a W-plasty was deemed most appropriate for repair.  Using a sterile surgical marker, the appropriate transposition arms of the W-plasty were drawn incorporating the defect and placing the expected incisions within the relaxed skin tension lines where possible.    The area thus outlined was incised deep to adipose tissue with a #15 scalpel blade.  The skin margins were undermined to an appropriate distance in all directions utilizing iris scissors.  The opposing transposition arms were then transposed into place in opposite direction and anchored with interrupted buried subcutaneous sutures.
Z Plasty Text: The lesion was extirpated to the level of the fat with a #15 scalpel blade.  Given the location of the defect, shape of the defect and the proximity to free margins a Z-plasty was deemed most appropriate for repair.  Using a sterile surgical marker, the appropriate transposition arms of the Z-plasty were drawn incorporating the defect and placing the expected incisions within the relaxed skin tension lines where possible.    The area thus outlined was incised deep to adipose tissue with a #15 scalpel blade.  The skin margins were undermined to an appropriate distance in all directions utilizing iris scissors.  The opposing transposition arms were then transposed into place in opposite direction and anchored with interrupted buried subcutaneous sutures.
Cheek Interpolation Flap Text: A decision was made to reconstruct the defect utilizing an interpolation axial flap and a staged reconstruction.  A telfa template was made of the defect.  This telfa template was then used to outline the Cheek Interpolation flap.  The donor area for the pedicle flap was then injected with anesthesia.  The flap was excised through the skin and subcutaneous tissue down to the layer of the underlying musculature.  The interpolation flap was carefully excised within this deep plane to maintain its blood supply.  The edges of the donor site were undermined.   The donor site was closed in a primary fashion.  The pedicle was then rotated into position and sutured.  Once the tube was sutured into place, adequate blood supply was confirmed with blanching and refill.  The pedicle was then wrapped with xeroform gauze and dressed appropriately with a telfa and gauze bandage to ensure continued blood supply and protect the attached pedicle.
Cheek-To-Nose Interpolation Flap Text: A decision was made to reconstruct the defect utilizing an interpolation axial flap and a staged reconstruction.  A telfa template was made of the defect.  This telfa template was then used to outline the Cheek-To-Nose Interpolation flap.  The donor area for the pedicle flap was then injected with anesthesia.  The flap was excised through the skin and subcutaneous tissue down to the layer of the underlying musculature.  The interpolation flap was carefully excised within this deep plane to maintain its blood supply.  The edges of the donor site were undermined.   The donor site was closed in a primary fashion.  The pedicle was then rotated into position and sutured.  Once the tube was sutured into place, adequate blood supply was confirmed with blanching and refill.  The pedicle was then wrapped with xeroform gauze and dressed appropriately with a telfa and gauze bandage to ensure continued blood supply and protect the attached pedicle.
Interpolation Flap Text: A decision was made to reconstruct the defect utilizing an interpolation axial flap and a staged reconstruction.  A telfa template was made of the defect.  This telfa template was then used to outline the interpolation flap.  The donor area for the pedicle flap was then injected with anesthesia.  The flap was excised through the skin and subcutaneous tissue down to the layer of the underlying musculature.  The interpolation flap was carefully excised within this deep plane to maintain its blood supply.  The edges of the donor site were undermined.   The donor site was closed in a primary fashion.  The pedicle was then rotated into position and sutured.  Once the tube was sutured into place, adequate blood supply was confirmed with blanching and refill.  The pedicle was then wrapped with xeroform gauze and dressed appropriately with a telfa and gauze bandage to ensure continued blood supply and protect the attached pedicle.
Melolabial Interpolation Flap Text: A decision was made to reconstruct the defect utilizing an interpolation axial flap and a staged reconstruction.  A telfa template was made of the defect.  This telfa template was then used to outline the melolabial interpolation flap.  The donor area for the pedicle flap was then injected with anesthesia.  The flap was excised through the skin and subcutaneous tissue down to the layer of the underlying musculature.  The pedicle flap was carefully excised within this deep plane to maintain its blood supply.  The edges of the donor site were undermined.   The donor site was closed in a primary fashion.  The pedicle was then rotated into position and sutured.  Once the tube was sutured into place, adequate blood supply was confirmed with blanching and refill.  The pedicle was then wrapped with xeroform gauze and dressed appropriately with a telfa and gauze bandage to ensure continued blood supply and protect the attached pedicle.
Mastoid Interpolation Flap Text: A decision was made to reconstruct the defect utilizing an interpolation axial flap and a staged reconstruction.  A telfa template was made of the defect.  This telfa template was then used to outline the mastoid interpolation flap.  The donor area for the pedicle flap was then injected with anesthesia.  The flap was excised through the skin and subcutaneous tissue down to the layer of the underlying musculature.  The pedicle flap was carefully excised within this deep plane to maintain its blood supply.  The edges of the donor site were undermined.   The donor site was closed in a primary fashion.  The pedicle was then rotated into position and sutured.  Once the tube was sutured into place, adequate blood supply was confirmed with blanching and refill.  The pedicle was then wrapped with xeroform gauze and dressed appropriately with a telfa and gauze bandage to ensure continued blood supply and protect the attached pedicle.
Posterior Auricular Interpolation Flap Text: A decision was made to reconstruct the defect utilizing an interpolation axial flap and a staged reconstruction.  A telfa template was made of the defect.  This telfa template was then used to outline the posterior auricular interpolation flap.  The donor area for the pedicle flap was then injected with anesthesia.  The flap was excised through the skin and subcutaneous tissue down to the layer of the underlying musculature.  The pedicle flap was carefully excised within this deep plane to maintain its blood supply.  The edges of the donor site were undermined.   The donor site was closed in a primary fashion.  The pedicle was then rotated into position and sutured.  Once the tube was sutured into place, adequate blood supply was confirmed with blanching and refill.  The pedicle was then wrapped with xeroform gauze and dressed appropriately with a telfa and gauze bandage to ensure continued blood supply and protect the attached pedicle.
Paramedian Forehead Flap Text: A decision was made to reconstruct the defect utilizing an interpolation axial flap and a staged reconstruction.  A telfa template was made of the defect.  This telfa template was then used to outline the paramedian forehead pedicle flap.  The donor area for the pedicle flap was then injected with anesthesia.  The flap was excised through the skin and subcutaneous tissue down to the layer of the underlying musculature.  The pedicle flap was carefully excised within this deep plane to maintain its blood supply.  The edges of the donor site were undermined.   The donor site was closed in a primary fashion.  The pedicle was then rotated into position and sutured.  Once the tube was sutured into place, adequate blood supply was confirmed with blanching and refill.  The pedicle was then wrapped with xeroform gauze and dressed appropriately with a telfa and gauze bandage to ensure continued blood supply and protect the attached pedicle.
Cheiloplasty (Less Than 50%) Text: A decision was made to reconstruct the defect with a  cheiloplasty.  The defect was undermined extensively.  Additional obicularis oris muscle was excised with a 15 blade scalpel.  The defect was converted into a full thickness wedge, of less than 50% of the vertical height of the lip, to facilite a better cosmetic result.  Small vessels were then tied off with 5-0 monocyrl. The obicularis oris, superficial fascia, adipose and dermis were then reapproximated.  After the deeper layers were approximated the epidermis was reapproximated with particular care given to realign the vermilion border.
Cheiloplasty (Complex) Text: A decision was made to reconstruct the defect with a  cheiloplasty.  The defect was undermined extensively.  Additional obicularis oris muscle was excised with a 15 blade scalpel.  The defect was converted into a full thickness wedge to facilite a better cosmetic result.  Small vessels were then tied off with 5-0 monocyrl. The obicularis oris, superficial fascia, adipose and dermis were then reapproximated.  After the deeper layers were approximated the epidermis was reapproximated with particular care given to realign the vermilion border.
Ear Wedge Repair Text: A wedge excision was completed by carrying down an excision through the full thickness of the ear and cartilage with an inward facing Burow's triangle. The wound was then closed in a layered fashion.
Full Thickness Lip Wedge Repair (Flap) Text: Given the location of the defect and the proximity to free margins a full thickness wedge repair was deemed most appropriate.  Using a sterile surgical marker, the appropriate repair was drawn incorporating the defect and placing the expected incisions perpendicular to the vermilion border.  The vermilion border was also meticulously outlined to ensure appropriate reapproximation during the repair.  The area thus outlined was incised through and through with a #15 scalpel blade.  The muscularis and dermis were reaproximated with deep sutures following hemostasis. Care was taken to realign the vermilion border before proceeding with the superficial closure.  Once the vermilion was realigned the superfical and mucosal closure was finished.
Ftsg Text: The defect edges were debeveled with a #15 scalpel blade.  Given the location of the defect, shape of the defect and the proximity to free margins a full thickness skin graft was deemed most appropriate.  Using a sterile surgical marker, the primary defect shape was transferred to the donor site. The area thus outlined was incised deep to adipose tissue with a #15 scalpel blade.  The harvested graft was then trimmed of adipose tissue until only dermis and epidermis was left.  The skin margins of the secondary defect were undermined to an appropriate distance in all directions utilizing iris scissors.  The secondary defect was closed with interrupted buried subcutaneous sutures.  The skin edges were then re-apposed with running  sutures.  The skin graft was then placed in the primary defect and oriented appropriately.
Split-Thickness Skin Graft Text: The defect edges were debeveled with a #15 scalpel blade.  Given the location of the defect, shape of the defect and the proximity to free margins a split thickness skin graft was deemed most appropriate.  Using a sterile surgical marker, the primary defect shape was transferred to the donor site. The split thickness graft was then harvested.  The skin graft was then placed in the primary defect and oriented appropriately.
Burow's Graft Text: The defect edges were debeveled with a #15 scalpel blade.  Given the location of the defect, shape of the defect, the proximity to free margins and the presence of a standing cone deformity a Burow's skin graft was deemed most appropriate. The standing cone was removed and this tissue was then trimmed to the shape of the primary defect. The adipose tissue was also removed until only dermis and epidermis were left.  The skin margins of the secondary defect were undermined to an appropriate distance in all directions utilizing iris scissors.  The secondary defect was closed with interrupted buried subcutaneous sutures.  The skin edges were then re-apposed with running  sutures.  The skin graft was then placed in the primary defect and oriented appropriately.
Cartilage Graft Text: The defect edges were debeveled with a #15 scalpel blade.  Given the location of the defect, shape of the defect, the fact the defect involved a full thickness cartilage defect a cartilage graft was deemed most appropriate.  An appropriate donor site was identified, cleansed, and anesthetized. The cartilage graft was then harvested and transferred to the recipient site, oriented appropriately and then sutured into place.  The secondary defect was then repaired using a primary closure.
Composite Graft Text: The defect edges were debeveled with a #15 scalpel blade.  Given the location of the defect, shape of the defect, the proximity to free margins and the fact the defect was full thickness a composite graft was deemed most appropriate.  The defect was outline and then transferred to the donor site.  A full thickness graft was then excised from the donor site. The graft was then placed in the primary defect, oriented appropriately and then sutured into place.  The secondary defect was then repaired using a primary closure.
Epidermal Autograft Text: The defect edges were debeveled with a #15 scalpel blade.  Given the location of the defect, shape of the defect and the proximity to free margins an epidermal autograft was deemed most appropriate.  Using a sterile surgical marker, the primary defect shape was transferred to the donor site. The epidermal graft was then harvested.  The skin graft was then placed in the primary defect and oriented appropriately.
Dermal Autograft Text: The defect edges were debeveled with a #15 scalpel blade.  Given the location of the defect, shape of the defect and the proximity to free margins a dermal autograft was deemed most appropriate.  Using a sterile surgical marker, the primary defect shape was transferred to the donor site. The area thus outlined was incised deep to adipose tissue with a #15 scalpel blade.  The harvested graft was then trimmed of adipose and epidermal tissue until only dermis was left.  The skin graft was then placed in the primary defect and oriented appropriately.
Skin Substitute Text: The defect edges were debeveled with a #15 scalpel blade.  Given the location of the defect, shape of the defect and the proximity to free margins a skin substitute graft was deemed most appropriate.  The graft material was trimmed to fit the size of the defect. The graft was then placed in the primary defect and oriented appropriately.
Tissue Cultured Epidermal Autograft Text: The defect edges were debeveled with a #15 scalpel blade.  Given the location of the defect, shape of the defect and the proximity to free margins a tissue cultured epidermal autograft was deemed most appropriate.  The graft was then trimmed to fit the size of the defect.  The graft was then placed in the primary defect and oriented appropriately.
Xenograft Text: The defect edges were debeveled with a #15 scalpel blade.  Given the location of the defect, shape of the defect and the proximity to free margins a xenograft was deemed most appropriate.  The graft was then trimmed to fit the size of the defect.  The graft was then placed in the primary defect and oriented appropriately.
Purse String (Simple) Text: Given the location of the defect and the characteristics of the surrounding skin a purse string closure was deemed most appropriate.  Undermining was performed circumfirentially around the surgical defect.  A purse string suture was then placed and tightened.
Purse String (Intermediate) Text: Given the location of the defect and the characteristics of the surrounding skin a purse string intermediate closure was deemed most appropriate.  Undermining was performed circumfirentially around the surgical defect.  A purse string suture was then placed and tightened.
Partial Purse String (Simple) Text: Given the location of the defect and the characteristics of the surrounding skin a simple purse string closure was deemed most appropriate.  Undermining was performed circumfirentially around the surgical defect.  A purse string suture was then placed and tightened. Wound tension only allowed a partial closure of the circular defect.
Partial Purse String (Intermediate) Text: Given the location of the defect and the characteristics of the surrounding skin an intermediate purse string closure was deemed most appropriate.  Undermining was performed circumfirentially around the surgical defect.  A purse string suture was then placed and tightened. Wound tension only allowed a partial closure of the circular defect.
Localized Dermabrasion With Wire Brush Text: The patient was draped in routine manner.  Localized dermabrasion using 3 x 17 mm wire brush was performed in routine manner to papillary dermis. This spot dermabrasion is being performed to complete skin cancer reconstruction. It also will eliminate the other sun damaged precancerous cells that are known to be part of the regional effect of a lifetime's worth of sun exposure. This localized dermabrasion is therapeutic and should not be considered cosmetic in any regard.
Tarsorrhaphy Text: A tarsorrhaphy was performed using Frost sutures.
Complex Repair And Flap Additional Text (Will Appearing After The Standard Complex Repair Text): The complex repair was not sufficient to completely close the primary defect. The remaining additional defect was repaired with the flap mentioned below.
Complex Repair And Graft Additional Text (Will Appearing After The Standard Complex Repair Text): The complex repair was not sufficient to completely close the primary defect. The remaining additional defect was repaired with the graft mentioned below.
Manual Repair Warning Statement: We plan on removing the manually selected variable below in favor of our much easier automatic structured text blocks found in the previous tab. We decided to do this to help make the flow better and give you the full power of structured data. Manual selection is never going to be ideal in our platform and I would encourage you to avoid using manual selection from this point on, especially since I will be sunsetting this feature. It is important that you do one of two things with the customized text below. First, you can save all of the text in a word file so you can have it for future reference. Second, transfer the text to the appropriate area in the Library tab. Lastly, if there is a flap or graft type which we do not have you need to let us know right away so I can add it in before the variable is hidden. No need to panic, we plan to give you roughly 6 months to make the change.
Same Histology In Subsequent Stages Text: The pattern and morphology of the tumor is as described in the first stage.
No Residual Tumor Seen Histology Text: There were no malignant cells seen in the sections examined.
Inflammation Suggestive Of Cancer Camouflage Histology Text: There was a dense lymphocytic infiltrate which prevented adequate histologic evaluation of adjacent structures.
Bcc Histology Text: There were numerous aggregates of basaloid cells.
Bcc Infiltrative Histology Text: There were numerous aggregates of basaloid cells demonstrating an infiltrative pattern.
Mart-1 - Positive Histology Text: MART-1 staining demonstrates areas of higher density and clustering of melanocytes with Pagetoid spread upwards within the epidermis. The surgical margins are positive for tumor cells.
Mart-1 - Negative Histology Text: MART-1 staining demonstrates a normal density and pattern of melanocytes along the dermal-epidermal junction. The surgical margins are negative for tumor cells.
Information: Selecting Yes will display possible errors in your note based on the variables you have selected. This validation is only offered as a suggestion for you. PLEASE NOTE THAT THE VALIDATION TEXT WILL BE REMOVED WHEN YOU FINALIZE YOUR NOTE. IF YOU WANT TO FAX A PRELIMINARY NOTE YOU WILL NEED TO TOGGLE THIS TO 'NO' IF YOU DO NOT WANT IT IN YOUR FAXED NOTE.

## 2020-07-10 ENCOUNTER — APPOINTMENT (RX ONLY)
Dept: URBAN - METROPOLITAN AREA CLINIC 22 | Facility: CLINIC | Age: 75
Setting detail: DERMATOLOGY
End: 2020-07-10

## 2020-07-10 DIAGNOSIS — Z48.817 ENCOUNTER FOR SURGICAL AFTERCARE FOLLOWING SURGERY ON THE SKIN AND SUBCUTANEOUS TISSUE: ICD-10-CM

## 2020-07-10 PROCEDURE — 99024 POSTOP FOLLOW-UP VISIT: CPT

## 2020-07-10 PROCEDURE — ? POST-OP WOUND EVALUATION

## 2020-07-10 ASSESSMENT — LOCATION ZONE DERM: LOCATION ZONE: FACE

## 2020-07-10 ASSESSMENT — LOCATION DETAILED DESCRIPTION DERM: LOCATION DETAILED: RIGHT CENTRAL MANDIBULAR CHEEK

## 2020-07-10 ASSESSMENT — LOCATION SIMPLE DESCRIPTION DERM: LOCATION SIMPLE: RIGHT CHEEK

## 2020-07-10 NOTE — PROCEDURE: POST-OP WOUND EVALUATION
Body Location Override (Optional): right jaw line
Detail Level: Detailed
Add 23464 Cpt? (Important Note: In 2017 The Use Of 40681 Is Being Tracked By Cms To Determine Future Global Period Reimbursement For Global Periods): yes
Wound Evaluated By (Optional): Hussein Teague MD
Wound Diameter In Cm(Optional): 0
Wound Crusting?: clean
Sutures?: intact
Wound Color?: pink
Any New Or Residual Neoplasm?: No
Patient To Follow-Up With?: our clinic
Follow Up Units (Optional): 1
Follow Up Time Frame (Optional): months

## 2020-08-07 ENCOUNTER — APPOINTMENT (RX ONLY)
Dept: URBAN - METROPOLITAN AREA CLINIC 22 | Facility: CLINIC | Age: 75
Setting detail: DERMATOLOGY
End: 2020-08-07

## 2020-08-07 DIAGNOSIS — Z48.817 ENCOUNTER FOR SURGICAL AFTERCARE FOLLOWING SURGERY ON THE SKIN AND SUBCUTANEOUS TISSUE: ICD-10-CM

## 2020-08-07 PROCEDURE — ? POST-OP WOUND EVALUATION

## 2020-08-07 PROCEDURE — 99024 POSTOP FOLLOW-UP VISIT: CPT

## 2020-08-07 ASSESSMENT — LOCATION DETAILED DESCRIPTION DERM: LOCATION DETAILED: RIGHT CENTRAL MANDIBULAR CHEEK

## 2020-08-07 ASSESSMENT — LOCATION ZONE DERM: LOCATION ZONE: FACE

## 2020-08-07 ASSESSMENT — LOCATION SIMPLE DESCRIPTION DERM: LOCATION SIMPLE: RIGHT CHEEK

## 2020-08-07 NOTE — PROCEDURE: POST-OP WOUND EVALUATION
Body Location Override (Optional): right jaw line
Detail Level: Detailed
Add 82884 Cpt? (Important Note: In 2017 The Use Of 70642 Is Being Tracked By Cms To Determine Future Global Period Reimbursement For Global Periods): yes
Wound Evaluated By (Optional): Hussein Teague MD
Wound Diameter In Cm(Optional): 0
Wound Crusting?: clean
Wound Color?: pink
Any New Or Residual Neoplasm?: No
Patient To Follow-Up With?: our clinic
Follow Up Units (Optional): 1
Follow Up Time Frame (Optional): months

## 2021-01-13 DIAGNOSIS — Z23 NEED FOR VACCINATION: ICD-10-CM

## 2024-05-10 ENCOUNTER — HOSPITAL ENCOUNTER (EMERGENCY)
Facility: MEDICAL CENTER | Age: 79
End: 2024-05-14
Attending: EMERGENCY MEDICINE
Payer: MEDICARE

## 2024-05-10 DIAGNOSIS — F03.C0 SEVERE DEMENTIA, UNSPECIFIED DEMENTIA TYPE, UNSPECIFIED WHETHER BEHAVIORAL, PSYCHOTIC, OR MOOD DISTURBANCE OR ANXIETY (HCC): ICD-10-CM

## 2024-05-10 DIAGNOSIS — S82.892A CLOSED FRACTURE OF LEFT ANKLE, INITIAL ENCOUNTER: ICD-10-CM

## 2024-05-10 DIAGNOSIS — Z51.5 HOSPICE CARE PATIENT: ICD-10-CM

## 2024-05-10 DIAGNOSIS — R18.8 CIRRHOSIS OF LIVER WITH ASCITES, UNSPECIFIED HEPATIC CIRRHOSIS TYPE (HCC): ICD-10-CM

## 2024-05-10 DIAGNOSIS — K74.60 CIRRHOSIS OF LIVER WITH ASCITES, UNSPECIFIED HEPATIC CIRRHOSIS TYPE (HCC): ICD-10-CM

## 2024-05-10 DIAGNOSIS — R18.8 OTHER ASCITES: ICD-10-CM

## 2024-05-10 LAB
FLUAV RNA SPEC QL NAA+PROBE: NEGATIVE
FLUBV RNA SPEC QL NAA+PROBE: NEGATIVE
RSV RNA SPEC QL NAA+PROBE: NEGATIVE
SARS-COV-2 RNA RESP QL NAA+PROBE: NOTDETECTED

## 2024-05-10 PROCEDURE — 86480 TB TEST CELL IMMUN MEASURE: CPT

## 2024-05-10 PROCEDURE — 29515 APPLICATION SHORT LEG SPLINT: CPT

## 2024-05-10 PROCEDURE — 29125 APPL SHORT ARM SPLINT STATIC: CPT

## 2024-05-10 PROCEDURE — 700105 HCHG RX REV CODE 258: Performed by: EMERGENCY MEDICINE

## 2024-05-10 PROCEDURE — 0241U HCHG SARS-COV-2 COVID-19 NFCT DS RESP RNA 4 TRGT ED POC: CPT

## 2024-05-10 PROCEDURE — 99285 EMERGENCY DEPT VISIT HI MDM: CPT

## 2024-05-10 PROCEDURE — 36415 COLL VENOUS BLD VENIPUNCTURE: CPT

## 2024-05-10 PROCEDURE — 49082 ABD PARACENTESIS: CPT

## 2024-05-10 PROCEDURE — 302875 HCHG BANDAGE ACE 4 OR 6""

## 2024-05-10 RX ORDER — LOPERAMIDE HYDROCHLORIDE 2 MG/1
2 CAPSULE ORAL 4 TIMES DAILY PRN
COMMUNITY

## 2024-05-10 RX ORDER — SODIUM CHLORIDE 9 MG/ML
1000 INJECTION, SOLUTION INTRAVENOUS ONCE
Status: COMPLETED | OUTPATIENT
Start: 2024-05-10 | End: 2024-05-10

## 2024-05-10 RX ORDER — GLIMEPIRIDE 1 MG/1
1 TABLET ORAL EVERY MORNING
COMMUNITY

## 2024-05-10 RX ORDER — LORAZEPAM 2 MG/ML
0.5 CONCENTRATE ORAL
COMMUNITY

## 2024-05-10 RX ORDER — LISINOPRIL 40 MG/1
40 TABLET ORAL EVERY MORNING
COMMUNITY

## 2024-05-10 RX ORDER — SPIRONOLACTONE 50 MG/1
50 TABLET, FILM COATED ORAL DAILY
COMMUNITY

## 2024-05-10 RX ORDER — PROPRANOLOL HYDROCHLORIDE 40 MG/1
40 TABLET ORAL 2 TIMES DAILY
COMMUNITY

## 2024-05-10 RX ORDER — AMLODIPINE BESYLATE 5 MG/1
5 TABLET ORAL DAILY
COMMUNITY

## 2024-05-10 RX ORDER — ALOGLIPTIN 12.5 MG/1
12.5 TABLET, FILM COATED ORAL
COMMUNITY

## 2024-05-10 RX ORDER — ARIPIPRAZOLE 2 MG/1
2 TABLET ORAL DAILY
COMMUNITY

## 2024-05-10 RX ORDER — FLUOXETINE HYDROCHLORIDE 40 MG/1
40 CAPSULE ORAL DAILY
COMMUNITY

## 2024-05-10 RX ORDER — DOXAZOSIN MESYLATE 1 MG/1
1 TABLET ORAL DAILY
COMMUNITY

## 2024-05-10 RX ORDER — OMEPRAZOLE 20 MG/1
20 CAPSULE, DELAYED RELEASE ORAL EVERY MORNING
COMMUNITY

## 2024-05-10 RX ORDER — FINASTERIDE 5 MG/1
5 TABLET, FILM COATED ORAL DAILY
COMMUNITY

## 2024-05-10 RX ADMIN — SODIUM CHLORIDE 1000 ML: 9 INJECTION, SOLUTION INTRAVENOUS at 13:52

## 2024-05-10 NOTE — ED TRIAGE NOTES
Moises Ge  79 y.o. male  Chief Complaint   Patient presents with    Other     [facility] from home for failure to thrive. [facility], nondisplaced fracture of the lateral malleolus with soft tissue swelling. [facility] was told by [facility] that someone will follow up them but no follow up happened. Wife unable to care for patient.  Bruising noted on lateral left ankle and lower leg. Pulse intact. Dementia, Aox0 at baseline. DNR with selective treatment. Did not come with original POLST. Saturating at 88% on RA, placed on 3 LPM.     Vitals:    05/10/24 1237   BP: 103/57   Pulse: 79   Resp: 18   SpO2: 96%

## 2024-05-10 NOTE — DISCHARGE PLANNING
"MONIKA asked to assist as Pt was brought to the ER for assistance with placement.  MONIKA met with Pt, Spouse Susan and Son bedside and was informed that the Pt is on service with  and that they have been working with them for the past week to get him placed with out success.  SW told that they have tried to get the Pt at both [facility] or [facility], but neither have had a bed yet.      MONIKA placed call to [facility] and left a message requesting a call back from [place] ([phone]).  MONIKA then placed call to Teddy at [facility] ([phone]) and was informed that they have all of the Pt's information however they will not have a bed until sometime this coming week.      MONIKA placed [facility] with [facility] ([phone]) and was informed that she has been working with this family to get the Pt placed and that the Pt is approved for placement, however they are just waiting for a bed.  Salome discussed the need for a TB test, COVID test and PASRR. MONIKA asked ERP for COVID and TB test.  PASRR was checked in the system ([id number]).    Sol from [facility] ([phone]) was updated that Pt was in the ER and stated that they may be able to take Pt next week as well if Pt is still in the ER. Sol asked that we call back Monday if placement is still needed.     SPOUSE: Marc \"Susan\" Luma [phone]    1553: MONIKA placed follow up call to [facility] ([phone]) and was informed that the Pt has been approved however they do not have the staffing currently to take any admissions.  Bria is unsure when they will be able to admit this Pt.  Bria ok with staff follow up calls next week if Pt is still in ED OBS.       "

## 2024-05-10 NOTE — ED NOTES
Posterior short with stirrup splint applied to patient's left lower extremity per ERP request. CMS intact before and after application of the splint. Patient tolerated well.

## 2024-05-10 NOTE — ED NOTES
Medication history reviewed with PT's wife at bedside    Med rec is complete per wife reporting    Allergies reviewed.     Wife denies any outpatient antibiotics in the last 30 days.     Patient is not taking anticoagulants.    Preferred pharmacy for this visit - [facility] [phone]

## 2024-05-10 NOTE — ED NOTES
Paracentesis done by ERP. Consent signed by Lizabeth,wife- POA. O2 discontinued by ERP. Aware of 87% O2 saturation.

## 2024-05-10 NOTE — ED PROVIDER NOTES
ED Provider Note    CHIEF COMPLAINT  Chief Complaint   Patient presents with    Other     [facility]. [facility] nondisplaced fracture of the lateral malleolus with soft tissue swelling. [facility] was told by [facility] that someone will follow up them but no follow up happened. Wife unable to care for patient.  Bruising noted on lateral left ankle and lower leg. Pulse intact. Dementia, Aox0 at baseline. DNR with selective treatment. Did not come with original POLST. Saturating at 88% on RA, placed on 3 LPM.       EXTERNAL RECORDS REVIEWED  [facility]leolus nondisplaced fracture    HPI/ROS  LIMITATION TO HISTORY   Select: History of dementia  OUTSIDE HISTORIAN(S):  Wife and daughter    Moises Ge is a 79 y.o. male who presents for placement into inpatient hospice.  The patient is already on hospice through [facility], he has had rapid deterioration over the past several weeks, has been living at home and the wife is unable to care for him.  2 of his sons have been helping as well, ultimately however the family is just not able to adequately care for him at home as he is needing increasing level of care.  The patient has had multiple falls, he has fracture of his left lateral malleolus of his leg, this was never splinted.  He said progressive abdominal distention as well, no history of paracentesis but does have known cirrhosis.  History of dementia, family confirms he is DNR, comfort measures only.    PAST MEDICAL HISTORY       SURGICAL HISTORY  patient denies any surgical history    FAMILY HISTORY  No family history on file.    SOCIAL HISTORY  Social History     Tobacco Use    Smoking status: Not on file    Smokeless tobacco: Not on file   Substance and Sexual Activity    Alcohol use: Not on file    Drug use: Not on file    Sexual activity: Not on file       CURRENT MEDICATIONS  Home Medications       Reviewed by Vicki MIKE  "MURIEL Shen (Registered Nurse) on 05/10/24 at 1244  Med List Status: Not Addressed     Medication Last Dose Status        Patient Tai Taking any Medications                           ALLERGIES  Not on File    PHYSICAL EXAM  VITAL SIGNS: /57   Pulse 79   Resp 18   Ht 1.753 m (5' 9\")   Wt 72.6 kg (160 lb)   SpO2 96%   BMI 23.63 kg/m²    Constitutional: Chronically ill-appearing, sleeping   HENT: Normocephalic, no obvious evidence of acute trauma.  Eyes: No scleral icterus. Normal conjunctiva   Thorax & Lungs: Increased respiratory rate appreciated, seems to have somewhat increased respiratory effort. Abdomen: Distended abdomen.  Bedside ultrasound reveals a significant amount of ascites  Skin: The exposed portions of skin reveal no obvious rash or other abnormalities.  Extremities/Musculoskeletal: Extensive ecchymosis of the left lower leg.  Normal pulses  Neurologic: Alert & oriented. No focal deficits observed.        RADIOLOGY/PROCEDURES     Paracentesis    Indication: Symptomatic control, hospice patient, ascites    Consent: The spouse was counseled regarding the procedure, it's indications, risks, potential complications and alternatives and any questions were answered. Consent was obtained.    Procedure: The patient was placed in the supine position with the head of the bed slightly elevated and bedside ultrasound was utilized to identify Free Fluid Identified without nearby bowel or critical anatomy and image retained as below.  Static image obtained with skin marked. The skin over the puncture site in the left lower quadrant region was prepped with betadine and draped in a sterile fashion. Local anesthesia was obtained by infiltration using 1% Lidocaine with epinephrine. A paracentesis catheter was then advanced into the abdominal cavity over a needle and the needle was withdrawn. Fluid was returned which was clear yellow fluid.  A total volume of 5100ml was withdrawn.    The patient tolerated the " procedure well.    Complications: None        COURSE & MEDICAL DECISION MAKING    ASSESSMENT, COURSE AND PLAN  Care Narrative: This is a 79-year-old hospice patient who is brought in by family as he requires higher level care than they can provide at home.  There are outpatient hospice company [facility] has been struggling to find him placement anywhere and it is getting to the point where the family just cannot take care of him anymore.  On exam he appears to have some increased respiratory effort with abdominal distention, bedside ultrasound reveals significant ascites, he has known cirrhosis, therapeutic paracentesis is going to be performed here in the emergency department for his comfort.  Will consult social work and case management regarding his needs of inpatient hospice care.  Additionally, he has a known fracture of the left lateral malleolus, he has not been splinted, he will be splinted here for pain control      ED OBS: Yes; I am placing the patient in to an observation status due to a diagnostic uncertainty as well as therapeutic intensity. Patient placed in observation status at 1:45 PM, 5/10/2024.     Observation plan is as follows: Observation in the emergency department until placement in an inpatient hospice facility        Family has left for the evening, and will return tomorrow and continue discussion regarding placement into hospice.  For now the patient is still in ED observation will be signed out to the oncoming physician    DISPOSITION AND DISCUSSIONS      Discussion of management with other QHP or appropriate source(s): Social Work to help coordinate hospice          Working diagnoses  1. Other ascites    2. Hospice care patient    Procedure: Paracentesis    Electronically signed by: Vic Trevino M.D., 5/10/2024 1:27 PM

## 2024-05-11 LAB — GLUCOSE BLD STRIP.AUTO-MCNC: 124 MG/DL (ref 65–99)

## 2024-05-11 PROCEDURE — 82962 GLUCOSE BLOOD TEST: CPT

## 2024-05-11 PROCEDURE — 700102 HCHG RX REV CODE 250 W/ 637 OVERRIDE(OP): Performed by: EMERGENCY MEDICINE

## 2024-05-11 PROCEDURE — A9270 NON-COVERED ITEM OR SERVICE: HCPCS | Performed by: EMERGENCY MEDICINE

## 2024-05-11 RX ORDER — GLIMEPIRIDE 2 MG/1
1 TABLET ORAL EVERY MORNING
Status: DISCONTINUED | OUTPATIENT
Start: 2024-05-11 | End: 2024-05-13

## 2024-05-11 RX ORDER — HALOPERIDOL 2 MG/ML
2 SOLUTION ORAL EVERY 4 HOURS PRN
Status: DISCONTINUED | OUTPATIENT
Start: 2024-05-11 | End: 2024-05-13

## 2024-05-11 RX ORDER — DOXAZOSIN MESYLATE 1 MG/1
1 TABLET ORAL DAILY
Status: DISCONTINUED | OUTPATIENT
Start: 2024-05-11 | End: 2024-05-13

## 2024-05-11 RX ORDER — LORAZEPAM 2 MG/ML
0.5 CONCENTRATE ORAL
Status: DISCONTINUED | OUTPATIENT
Start: 2024-05-11 | End: 2024-05-11

## 2024-05-11 RX ORDER — FINASTERIDE 5 MG/1
5 TABLET, FILM COATED ORAL DAILY
Status: DISCONTINUED | OUTPATIENT
Start: 2024-05-11 | End: 2024-05-13

## 2024-05-11 RX ORDER — SPIRONOLACTONE 25 MG/1
50 TABLET ORAL DAILY
Status: DISCONTINUED | OUTPATIENT
Start: 2024-05-11 | End: 2024-05-13

## 2024-05-11 RX ORDER — PROPRANOLOL HYDROCHLORIDE 10 MG/1
40 TABLET ORAL 2 TIMES DAILY
Status: DISCONTINUED | OUTPATIENT
Start: 2024-05-11 | End: 2024-05-13

## 2024-05-11 RX ORDER — OMEPRAZOLE 20 MG/1
20 CAPSULE, DELAYED RELEASE ORAL EVERY MORNING
Status: DISCONTINUED | OUTPATIENT
Start: 2024-05-11 | End: 2024-05-13

## 2024-05-11 RX ORDER — LISINOPRIL 20 MG/1
40 TABLET ORAL EVERY MORNING
Status: DISCONTINUED | OUTPATIENT
Start: 2024-05-11 | End: 2024-05-13

## 2024-05-11 RX ORDER — AMLODIPINE BESYLATE 5 MG/1
5 TABLET ORAL DAILY
Status: DISCONTINUED | OUTPATIENT
Start: 2024-05-11 | End: 2024-05-13

## 2024-05-11 RX ORDER — ARIPIPRAZOLE 2 MG/1
2 TABLET ORAL DAILY
Status: DISCONTINUED | OUTPATIENT
Start: 2024-05-11 | End: 2024-05-13

## 2024-05-11 RX ORDER — FLUOXETINE HYDROCHLORIDE 20 MG/1
40 CAPSULE ORAL DAILY
Status: DISCONTINUED | OUTPATIENT
Start: 2024-05-11 | End: 2024-05-13

## 2024-05-11 RX ADMIN — OMEPRAZOLE 20 MG: 20 CAPSULE, DELAYED RELEASE ORAL at 11:00

## 2024-05-11 RX ADMIN — FLUOXETINE HYDROCHLORIDE 40 MG: 20 CAPSULE ORAL at 10:58

## 2024-05-11 RX ADMIN — ARIPIPRAZOLE 2 MG: 2 TABLET ORAL at 11:02

## 2024-05-11 NOTE — DISCHARGE PLANNING
MSW met with pt's son Jackson for update. MSW provided update that at this time pending bed at SNF vs [facility] at this time. Will round with the [facility] and SNF facilities on Monday to see about acceptance. Will request DPA to send SNF referrals.

## 2024-05-11 NOTE — ED NOTES
"Bedside report from Jose CUNNINGHAM.  Pt has bed alarm x2 in place.  He is oriented to person only.  Very agitated, saying \"I want out of this fucking place.\"  PT refusing oxygen at this time.  Reoriented pt to situation and falls precautions.  Curtain and door left open for safety.  "

## 2024-05-11 NOTE — DISCHARGE PLANNING
Per CM RN, DPA sent blanket SNF referrals to [facility], [facility], [facility], [facility] and [facility].      JAZMINE RN notified

## 2024-05-11 NOTE — PROGRESS NOTES
Patient's home medications have been reviewed by the pharmacy team.     No past medical history on file.    Patient's Medications   New Prescriptions    No medications on file   Previous Medications    ALOGLIPTIN BENZOATE 12.5 MG TAB    Take 12.5 mg by mouth 2 times a day.    AMLODIPINE (NORVASC) 5 MG TAB    Take 5 mg by mouth every day.    ARIPIPRAZOLE (ABILIFY) 2 MG TABLET    Take 2 mg by mouth every day.    DOXAZOSIN (CARDURA) 1 MG TAB    Take 1 mg by mouth every day.    FINASTERIDE (PROSCAR) 5 MG TAB    Take 5 mg by mouth every day.    FLUOXETINE (PROZAC) 40 MG CAPSULE    Take 40 mg by mouth every day.    GLIMEPIRIDE (AMARYL) 1 MG TABLET    Take 1 mg by mouth every morning.    LISINOPRIL (PRINIVIL) 40 MG TABLET    Take 40 mg by mouth every morning.    LOPERAMIDE (IMODIUM) 2 MG CAP    Take 2 mg by mouth 4 times a day as needed for Diarrhea.    LORAZEPAM (LORAZEPAM INTENSOL) 2 MG/ML CONC    Take 0.5 mg by mouth every 2 hours as needed (anxiety). 0.25ml= 0.5mg    OMEPRAZOLE (PRILOSEC) 20 MG DELAYED-RELEASE CAPSULE    Take 20 mg by mouth every morning.    PROPRANOLOL (INDERAL) 40 MG TAB    Take 40 mg by mouth 2 times a day. 1/2 tab = 20mg    SPIRONOLACTONE (ALDACTONE) 50 MG TAB    Take 50 mg by mouth every day.   Modified Medications    No medications on file   Discontinued Medications    No medications on file          A:  Medications do not appear to be contributing to current complaints.     P:    No recommendations at this time. Home medications have been reordered as appropriate.    Kade Dawson, PharmD

## 2024-05-11 NOTE — ED NOTES
Pt sleeping on hospital bed. Pt turned by self to left side. Pt refuses to have assistance with turns. No acute signs of distress or discomfort present

## 2024-05-11 NOTE — ED NOTES
Late Entry:  1230  .Bedside report received from off going RN/tech: Shilo, assumed care of patient.  POC discussed with patient. Call light within reach, all needs addressed at this time.       Fall risk interventions in place: Move the patient closer to the nurse's station, Patient's personal possessions are with in their safe reach, Place socks on patient, Place fall risk sign on patient's door, Give patient urinal if applicable, Keep floor surfaces clean and dry, Accompanied to restroom, and Bed Alarm in use (all applicable per [place] Fall risk assessment)   Continuous monitoring: Not Applicable   IVF/IV medications: Not Applicable   Oxygen: Room Air  Bedside sitter: Not Applicable   Isolation: Not Applicable  Son at bedside

## 2024-05-11 NOTE — ED NOTES
"Pt resting on hospital bed, refuses turns, pt at one point requesting PIV removed, then told this RN \"go away, I don't need help.\"  "

## 2024-05-11 NOTE — ED NOTES
Pt's son at bedside, updated him about plan of care and pt condition today.  Pt's son requesting to meet with SW; she will come by shortly.

## 2024-05-11 NOTE — ED NOTES
Pt and family requesting pt be moved to chair. Bed alarm placed in chair. Chair locked. Call light in reach. Son at bedside

## 2024-05-11 NOTE — ED NOTES
Provided linen change and clean brief.  Assisted pt with oral care.    Pt remains Ox1, reoriented pt to situation.

## 2024-05-11 NOTE — PROGRESS NOTES
"ED Observation Progress Note    Date of Service: 05/11/24    Interval History and Interventions  Patient continues to wait in the emergency department for placement into inpatient hospice.  Patient is being followed closely by case management to help with this.  Patient with known significant dementia and cirrhosis.  Patient is to receive comfort measures only while we are attempting to find placement.  No issues throughout my shift    Physical Exam  /58   Pulse 78   Resp 18   Ht 1.753 m (5' 9\")   Wt 72.6 kg (160 lb)   SpO2 91%   BMI 23.63 kg/m² .    Constitutional: Awake and alert. Nontoxic  HENT:  Grossly normal  Eyes: Grossly normal  Neck: Normal range of motion  Cardiovascular: Normal heart rate   Thorax & Lungs: No respiratory distress  Abdomen: Nontender  Skin:  No pathologic rash.   Extremities: Well perfused  Psychiatric: Affect normal    Labs  Results for orders placed or performed during the hospital encounter of 05/10/24   POC CoV-2, FLU A/B, RSV by PCR   Result Value Ref Range    POC Influenza A RNA, PCR Negative Negative    POC Influenza B RNA, PCR Negative Negative    POC RSV, by PCR Negative Negative    POC SARS-CoV-2, PCR NotDetected        Radiology  No orders to display       Problem List    1. Other ascites    2. Hospice care patient          Electronically signed by: Pradeep Valdovinos M.D., 5/11/2024 5:04 AM          "

## 2024-05-11 NOTE — ED NOTES
Patient remains comfortable on gurney, no identifiable needs at this time. Equal chest rise and fall bilaterally. Pending placement. Safety measures in place, call light within reach. Bed alarms in place.

## 2024-05-11 NOTE — ED NOTES
01/12/19 1955   Patient Assessment/Suction   Level of Consciousness (AVPU) alert   Respiratory Effort Unlabored   PRE-TX-O2-ETCO2   O2 Device (Oxygen Therapy) room air   SpO2 99 %   Pulse Oximetry Type Intermittent      Pt resting on hospital bed. No acute signs of distress present. Even unlabored breathing noted. Pt removed monitoring devices. Continues to refuse repositioning and devices to be replaced on pt.

## 2024-05-11 NOTE — ED NOTES
Woke pt for morning medications, pt refusing medication at this time. Pt denies any needs at this time.

## 2024-05-11 NOTE — ED NOTES
Bedside report received from off going RN/tech: MARISA Steve, assumed care of patient.  POC discussed with patient. Call light within reach, all needs addressed at this time.       Fall risk interventions in place: Move the patient closer to the nurse's station, Patient's personal possessions are with in their safe reach, Place socks on patient, Place fall risk sign on patient's door, and Bed Alarm in use (all applicable per [place] Fall risk assessment)   Continuous monitoring: Pulse Ox or Blood Pressure  IVF/IV medications: Not Applicable   Oxygen: Room Air  Bedside sitter: Not Applicable   Isolation: Not Applicable

## 2024-05-12 PROCEDURE — A9270 NON-COVERED ITEM OR SERVICE: HCPCS | Performed by: EMERGENCY MEDICINE

## 2024-05-12 PROCEDURE — 700102 HCHG RX REV CODE 250 W/ 637 OVERRIDE(OP): Performed by: EMERGENCY MEDICINE

## 2024-05-12 RX ADMIN — GLIMEPIRIDE 1 MG: 2 TABLET ORAL at 08:08

## 2024-05-12 RX ADMIN — FLUOXETINE HYDROCHLORIDE 40 MG: 20 CAPSULE ORAL at 08:14

## 2024-05-12 RX ADMIN — SPIRONOLACTONE 50 MG: 25 TABLET ORAL at 08:13

## 2024-05-12 RX ADMIN — FINASTERIDE 5 MG: 5 TABLET, FILM COATED ORAL at 08:09

## 2024-05-12 RX ADMIN — AMLODIPINE BESYLATE 5 MG: 5 TABLET ORAL at 08:04

## 2024-05-12 RX ADMIN — OMEPRAZOLE 20 MG: 20 CAPSULE, DELAYED RELEASE ORAL at 08:14

## 2024-05-12 RX ADMIN — ARIPIPRAZOLE 2 MG: 2 TABLET ORAL at 08:05

## 2024-05-12 NOTE — ED NOTES
Rounded on patient, resting on bed watching TV, denies any current needs. Bed alarm in place. VSS.

## 2024-05-12 NOTE — PROGRESS NOTES
"ED Observation Progress Note    Date of Service: 05/12/24    Interval History and Interventions  The patient continues to hold in the emergency department awaiting final disposition.  Case management is involved.  No acute issues overnight.    Physical Exam  /58   Pulse 71   Temp 36.1 °C (97 °F) (Temporal)   Resp 16   Ht 1.753 m (5' 9\")   Wt 72.6 kg (160 lb)   SpO2 93%   BMI 23.63 kg/m² .    Constitutional: Awake and alert. Nontoxic  HENT:  Grossly normal  Eyes: Grossly normal  Neck: Normal range of motion  Cardiovascular: Normal heart rate   Thorax & Lungs: No respiratory distress  Abdomen: Nontender  Skin:  No pathologic rash.   Extremities: Well perfused  Psychiatric: Affect normal    Labs  Results for orders placed or performed during the hospital encounter of 05/10/24   POC CoV-2, FLU A/B, RSV by PCR   Result Value Ref Range    POC Influenza A RNA, PCR Negative Negative    POC Influenza B RNA, PCR Negative Negative    POC RSV, by PCR Negative Negative    POC SARS-CoV-2, PCR NotDetected    POCT glucose device results   Result Value Ref Range    POC Glucose, Blood 124 (H) 65 - 99 mg/dL       Radiology  No orders to display       Problem List  1. Other ascites    2. Hospice care patient          Electronically signed by: Eric Jacobson M.D., 5/12/2024 2:05 PM          "

## 2024-05-12 NOTE — ED NOTES
Pt awake and fiddling with splint. Pt repositioned in upright position, splint re-inforced with ACE wraps. Pt fed breakfast and given medications. Pt became agitated towards the end, not wanting any more at this time. Will attempt the rest of medications again after pt has time to rest. Pt upright and watching TV at this time. Bed alarm in place.

## 2024-05-12 NOTE — ED NOTES
"Pt requesting \"scissors\" so he can cut the blanket. Pt refusing medication to take. Pt cooperative and redirectable   "

## 2024-05-12 NOTE — ED NOTES
Pt back to hospital bed, on bed alarms. Call raymond in reach  Son at [facility] e

## 2024-05-12 NOTE — ED NOTES
Pt rounded, trying to get out of bed, pt redirected to position properly in bed. Pt cooperative and denies any pain

## 2024-05-12 NOTE — ED NOTES
Report received from off going RN/tech: Zoe, assumed care of patient.  POC discussed with patient. Call light within reach, all needs addressed at this time.       Fall risk interventions in place: Move the patient closer to the nurse's station, Patient's personal possessions are with in their safe reach, Place fall risk sign on patient's door, Give patient urinal if applicable, Keep floor surfaces clean and dry, Accompanied to restroom, and Bed Alarm in use (all applicable per [place] Fall risk assessment)   Continuous monitoring: Pulse Ox or Blood Pressure  IVF/IV medications: Not Applicable   Oxygen: Room Air  Bedside sitter: Not Applicable   Isolation: Not Applicable      Pt Aox1, verbalized trying to get out of bed. Pt on bed alarm. Pt calm and redirected

## 2024-05-12 NOTE — ED NOTES
Rounded on patient, resting on bed in no distress. Pt watching TV, no current needs at this time. Bed alarm in place.

## 2024-05-12 NOTE — ED NOTES
Bedside report received from Dennis CUNNINGHAM. Pt resting on bed. Pt not wanting to answer questions at this time. Per report, pt refusing morning meds, will attempt again when patient is more awake. Pt on RA and on bed alarm.

## 2024-05-12 NOTE — ED NOTES
Skin Assessment Completed by MARISA Toledo    Head WDL  Ears Redness and Blanching  Nose WDL  Mouth WDL  Neck WDL  Breast/Chest Bruising and Blanching  Shoulder Blades Redness and Blanching  Spine WDL  (R) Arm/Elbow/Hand Redness and Blanching  (L) Arm/Elbow/Hand Redness and Blanching  Abdomen WDL  Groin WDL  Scrotum/Coccyx/Buttocks Redness, Blanching, Excoriation, and Moisture Fissure  (R) Leg Redness, Blanching, Scab, Bruising, and Abrasion  (L) Leg Redness, Blanching, Scab, Bruising, and Abrasion  (R) Heel/Foot/Toe Redness, Blanching, and Boggy  (L) Heel/Foot/Toe Redness, Blanching, and Boggy              Interventions In Place Pillows, Q2 Turns, Barrier Cream, and Heels Loaded W/Pillows    Possible Skin Injury Yes    Pictures Uploaded Into Epic Yes  Wound Consult Placed Yes  RN Wound Prevention Protocol Ordered Yes

## 2024-05-12 NOTE — ED NOTES
Bedside report received from off going RN/tech: MARISA Batres assumed care of patient.  POC discussed with patient. Call light within reach, all needs addressed at this time.       Fall risk interventions in place: Move the patient closer to the nurse's station, Patient's personal possessions are with in their safe reach, Place fall risk sign on patient's door, Give patient urinal if applicable, Keep floor surfaces clean and dry, and Bed Alarm in use (all applicable per [place] Fall risk assessment)   Continuous monitoring: Not Applicable   IVF/IV medications: Not Applicable   Oxygen: Room Air  Bedside sitter: Not Applicable   Isolation: Not Applicable

## 2024-05-12 NOTE — ED NOTES
Pt becoming anxious. Pharmacy notified about PRN ativan for pt.   PT redirectable, blanket provided. Son at bedside.

## 2024-05-12 NOTE — ED NOTES
Rounded on patient. Brief changed. Pt had soft BM. Pt cleansed and linens changed. Redness to perineum, picture placed in chart. Barrier cream placed. Offered pt to sit in chair, pt declined. Pt watching TV, bed alarm in place.

## 2024-05-12 NOTE — ED NOTES
Walked in room and pt had splint off, diaper off and was naked. Pt aox1 at baseline with no new changes. Pt left ankle resplinted. Diaper and gown placed. Pt repositioned in bed.

## 2024-05-12 NOTE — ED NOTES
Rounded on patient, sleeping on bed in no distress. No current needs at this time, bed alarm in place.

## 2024-05-12 NOTE — ED NOTES
Rounded on patient, sleeping on bed in no distress. Attempted to give pt additional medications, pt refusing. Pt repositioned for comfort, no other needs at this time, bed alarm in place.

## 2024-05-12 NOTE — ED NOTES
Pt trying to removed pulse ox sticker and BP cuffed. Pt cooperative and follow directions. Pt request for extra blankets

## 2024-05-13 ENCOUNTER — APPOINTMENT (OUTPATIENT)
Dept: RADIOLOGY | Facility: MEDICAL CENTER | Age: 79
End: 2024-05-13
Attending: HOSPITALIST
Payer: MEDICARE

## 2024-05-13 PROBLEM — J18.9 MULTIFOCAL PNEUMONIA: Status: ACTIVE | Noted: 2024-05-13

## 2024-05-13 PROBLEM — F01.50 VASCULAR DEMENTIA (HCC): Status: ACTIVE | Noted: 2024-05-13

## 2024-05-13 PROBLEM — Z71.89 ACP (ADVANCE CARE PLANNING): Status: ACTIVE | Noted: 2024-05-13

## 2024-05-13 PROBLEM — N39.0 ACUTE UTI: Status: ACTIVE | Noted: 2024-05-13

## 2024-05-13 PROBLEM — D69.6 THROMBOCYTOPENIA (HCC): Status: ACTIVE | Noted: 2024-05-13

## 2024-05-13 PROBLEM — D64.9 NORMOCYTIC ANEMIA: Status: ACTIVE | Noted: 2024-05-13

## 2024-05-13 PROBLEM — I95.9 HYPOTENSION: Status: ACTIVE | Noted: 2024-05-13

## 2024-05-13 PROBLEM — N17.9 AKI (ACUTE KIDNEY INJURY) (HCC): Status: ACTIVE | Noted: 2024-05-13

## 2024-05-13 PROBLEM — R53.2 FUNCTIONAL QUADRIPLEGIA (HCC): Status: ACTIVE | Noted: 2024-05-13

## 2024-05-13 LAB
ALBUMIN SERPL BCP-MCNC: 2.4 G/DL (ref 3.2–4.9)
ALBUMIN/GLOB SERPL: 1 G/DL
ALP SERPL-CCNC: 142 U/L (ref 30–99)
ALT SERPL-CCNC: 14 U/L (ref 2–50)
ANION GAP SERPL CALC-SCNC: 8 MMOL/L (ref 7–16)
APPEARANCE UR: ABNORMAL
AST SERPL-CCNC: 66 U/L (ref 12–45)
BACTERIA #/AREA URNS HPF: ABNORMAL /HPF
BASOPHILS # BLD AUTO: 0.5 % (ref 0–1.8)
BASOPHILS # BLD: 0.04 K/UL (ref 0–0.12)
BILIRUB SERPL-MCNC: 1.2 MG/DL (ref 0.1–1.5)
BILIRUB UR QL STRIP.AUTO: NEGATIVE
BUN SERPL-MCNC: 34 MG/DL (ref 8–22)
CALCIUM ALBUM COR SERPL-MCNC: 11.5 MG/DL (ref 8.5–10.5)
CALCIUM SERPL-MCNC: 10.2 MG/DL (ref 8.5–10.5)
CHLORIDE SERPL-SCNC: 107 MMOL/L (ref 96–112)
CO2 SERPL-SCNC: 20 MMOL/L (ref 20–33)
COLOR UR: YELLOW
CREAT SERPL-MCNC: 1.44 MG/DL (ref 0.5–1.4)
EOSINOPHIL # BLD AUTO: 0.11 K/UL (ref 0–0.51)
EOSINOPHIL NFR BLD: 1.3 % (ref 0–6.9)
EPI CELLS #/AREA URNS HPF: NEGATIVE /HPF
ERYTHROCYTE [DISTWIDTH] IN BLOOD BY AUTOMATED COUNT: 62.6 FL (ref 35.9–50)
FLUAV RNA SPEC QL NAA+PROBE: NEGATIVE
FLUBV RNA SPEC QL NAA+PROBE: NEGATIVE
GAMMA INTERFERON BACKGROUND BLD IA-ACNC: 0.06 IU/ML
GFR SERPLBLD CREATININE-BSD FMLA CKD-EPI: 49 ML/MIN/1.73 M 2
GLOBULIN SER CALC-MCNC: 2.5 G/DL (ref 1.9–3.5)
GLUCOSE SERPL-MCNC: 146 MG/DL (ref 65–99)
GLUCOSE UR STRIP.AUTO-MCNC: NEGATIVE MG/DL
HCT VFR BLD AUTO: 36.2 % (ref 42–52)
HGB BLD-MCNC: 12.2 G/DL (ref 14–18)
HYALINE CASTS #/AREA URNS LPF: ABNORMAL /LPF
IMM GRANULOCYTES # BLD AUTO: 0.03 K/UL (ref 0–0.11)
IMM GRANULOCYTES NFR BLD AUTO: 0.4 % (ref 0–0.9)
KETONES UR STRIP.AUTO-MCNC: NEGATIVE MG/DL
LEUKOCYTE ESTERASE UR QL STRIP.AUTO: ABNORMAL
LYMPHOCYTES # BLD AUTO: 1.47 K/UL (ref 1–4.8)
LYMPHOCYTES NFR BLD: 17.6 % (ref 22–41)
M TB IFN-G BLD-IMP: NEGATIVE
M TB IFN-G CD4+ BCKGRND COR BLD-ACNC: 0 IU/ML
MCH RBC QN AUTO: 31.6 PG (ref 27–33)
MCHC RBC AUTO-ENTMCNC: 33.7 G/DL (ref 32.3–36.5)
MCV RBC AUTO: 93.8 FL (ref 81.4–97.8)
MICRO URNS: ABNORMAL
MITOGEN IGNF BCKGRD COR BLD-ACNC: >10 IU/ML
MONOCYTES # BLD AUTO: 0.55 K/UL (ref 0–0.85)
MONOCYTES NFR BLD AUTO: 6.6 % (ref 0–13.4)
NEUTROPHILS # BLD AUTO: 6.15 K/UL (ref 1.82–7.42)
NEUTROPHILS NFR BLD: 73.6 % (ref 44–72)
NITRITE UR QL STRIP.AUTO: POSITIVE
NRBC # BLD AUTO: 0 K/UL
NRBC BLD-RTO: 0 /100 WBC (ref 0–0.2)
PH UR STRIP.AUTO: 6 [PH] (ref 5–8)
PLATELET # BLD AUTO: 130 K/UL (ref 164–446)
PMV BLD AUTO: 10.5 FL (ref 9–12.9)
POTASSIUM SERPL-SCNC: 5 MMOL/L (ref 3.6–5.5)
PROT SERPL-MCNC: 4.9 G/DL (ref 6–8.2)
PROT UR QL STRIP: NEGATIVE MG/DL
QFT TB2 - NIL TBQ2: 0 IU/ML
RBC # BLD AUTO: 3.86 M/UL (ref 4.7–6.1)
RBC # URNS HPF: ABNORMAL /HPF
RBC UR QL AUTO: ABNORMAL
RSV RNA SPEC QL NAA+PROBE: NEGATIVE
SARS-COV-2 RNA RESP QL NAA+PROBE: NOTDETECTED
SODIUM SERPL-SCNC: 135 MMOL/L (ref 135–145)
SP GR UR STRIP.AUTO: 1.02
SPECIMEN SOURCE: NORMAL
UROBILINOGEN UR STRIP.AUTO-MCNC: 1 MG/DL
WBC # BLD AUTO: 8.4 K/UL (ref 4.8–10.8)
WBC #/AREA URNS HPF: ABNORMAL /HPF

## 2024-05-13 PROCEDURE — 0241U HCHG SARS-COV-2 COVID-19 NFCT DS RESP RNA 4 TRGT MIC: CPT

## 2024-05-13 PROCEDURE — A9270 NON-COVERED ITEM OR SERVICE: HCPCS | Performed by: EMERGENCY MEDICINE

## 2024-05-13 PROCEDURE — 81001 URINALYSIS AUTO W/SCOPE: CPT

## 2024-05-13 PROCEDURE — 700102 HCHG RX REV CODE 250 W/ 637 OVERRIDE(OP): Performed by: EMERGENCY MEDICINE

## 2024-05-13 PROCEDURE — 80053 COMPREHEN METABOLIC PANEL: CPT

## 2024-05-13 PROCEDURE — 97602 WOUND(S) CARE NON-SELECTIVE: CPT

## 2024-05-13 PROCEDURE — 71045 X-RAY EXAM CHEST 1 VIEW: CPT

## 2024-05-13 PROCEDURE — 700102 HCHG RX REV CODE 250 W/ 637 OVERRIDE(OP): Performed by: HOSPITALIST

## 2024-05-13 PROCEDURE — A9270 NON-COVERED ITEM OR SERVICE: HCPCS | Performed by: HOSPITALIST

## 2024-05-13 PROCEDURE — 99497 ADVNCD CARE PLAN 30 MIN: CPT | Performed by: HOSPITALIST

## 2024-05-13 PROCEDURE — 85025 COMPLETE CBC W/AUTO DIFF WBC: CPT

## 2024-05-13 PROCEDURE — 99285 EMERGENCY DEPT VISIT HI MDM: CPT | Mod: 25 | Performed by: HOSPITALIST

## 2024-05-13 RX ORDER — AMOXICILLIN 250 MG
2 CAPSULE ORAL 2 TIMES DAILY
Status: DISCONTINUED | OUTPATIENT
Start: 2024-05-13 | End: 2024-05-13

## 2024-05-13 RX ORDER — AZITHROMYCIN 500 MG/5ML
500 INJECTION, POWDER, LYOPHILIZED, FOR SOLUTION INTRAVENOUS EVERY 24 HOURS
Qty: 750 ML | Refills: 0 | Status: DISCONTINUED | OUTPATIENT
Start: 2024-05-13 | End: 2024-05-13

## 2024-05-13 RX ORDER — ONDANSETRON 2 MG/ML
4 INJECTION INTRAMUSCULAR; INTRAVENOUS EVERY 4 HOURS PRN
Status: DISCONTINUED | OUTPATIENT
Start: 2024-05-13 | End: 2024-05-13

## 2024-05-13 RX ORDER — LORAZEPAM 2 MG/ML
1 INJECTION INTRAMUSCULAR
Status: DISCONTINUED | OUTPATIENT
Start: 2024-05-13 | End: 2024-05-14 | Stop reason: HOSPADM

## 2024-05-13 RX ORDER — ATROPINE SULFATE 10 MG/ML
2 SOLUTION/ DROPS OPHTHALMIC EVERY 4 HOURS PRN
Status: DISCONTINUED | OUTPATIENT
Start: 2024-05-13 | End: 2024-05-14 | Stop reason: HOSPADM

## 2024-05-13 RX ORDER — ACETAMINOPHEN 650 MG/1
650 SUPPOSITORY RECTAL EVERY 4 HOURS PRN
Status: DISCONTINUED | OUTPATIENT
Start: 2024-05-13 | End: 2024-05-14 | Stop reason: HOSPADM

## 2024-05-13 RX ORDER — ACETAMINOPHEN 325 MG/1
650 TABLET ORAL EVERY 6 HOURS PRN
Status: DISCONTINUED | OUTPATIENT
Start: 2024-05-13 | End: 2024-05-13

## 2024-05-13 RX ORDER — SODIUM CHLORIDE 9 MG/ML
1000 INJECTION, SOLUTION INTRAVENOUS ONCE
Status: DISCONTINUED | OUTPATIENT
Start: 2024-05-13 | End: 2024-05-13

## 2024-05-13 RX ORDER — ONDANSETRON 2 MG/ML
8 INJECTION INTRAMUSCULAR; INTRAVENOUS EVERY 8 HOURS PRN
Status: DISCONTINUED | OUTPATIENT
Start: 2024-05-13 | End: 2024-05-14 | Stop reason: HOSPADM

## 2024-05-13 RX ORDER — LORAZEPAM 2 MG/ML
1 CONCENTRATE ORAL
Status: DISCONTINUED | OUTPATIENT
Start: 2024-05-13 | End: 2024-05-14 | Stop reason: HOSPADM

## 2024-05-13 RX ORDER — HYDROMORPHONE HYDROCHLORIDE 1 MG/ML
0.5 INJECTION, SOLUTION INTRAMUSCULAR; INTRAVENOUS; SUBCUTANEOUS
Status: DISCONTINUED | OUTPATIENT
Start: 2024-05-13 | End: 2024-05-14 | Stop reason: HOSPADM

## 2024-05-13 RX ORDER — ACETAMINOPHEN 325 MG/1
650 TABLET ORAL EVERY 4 HOURS PRN
Status: DISCONTINUED | OUTPATIENT
Start: 2024-05-13 | End: 2024-05-14 | Stop reason: HOSPADM

## 2024-05-13 RX ORDER — SODIUM CHLORIDE 9 MG/ML
500 INJECTION, SOLUTION INTRAVENOUS ONCE
Status: DISCONTINUED | OUTPATIENT
Start: 2024-05-13 | End: 2024-05-14 | Stop reason: HOSPADM

## 2024-05-13 RX ORDER — POLYETHYLENE GLYCOL 3350 17 G/17G
1 POWDER, FOR SOLUTION ORAL
Status: DISCONTINUED | OUTPATIENT
Start: 2024-05-13 | End: 2024-05-13

## 2024-05-13 RX ORDER — ONDANSETRON 4 MG/1
8 TABLET, ORALLY DISINTEGRATING ORAL EVERY 8 HOURS PRN
Status: DISCONTINUED | OUTPATIENT
Start: 2024-05-13 | End: 2024-05-14 | Stop reason: HOSPADM

## 2024-05-13 RX ORDER — ONDANSETRON 4 MG/1
4 TABLET, ORALLY DISINTEGRATING ORAL EVERY 4 HOURS PRN
Status: DISCONTINUED | OUTPATIENT
Start: 2024-05-13 | End: 2024-05-13

## 2024-05-13 RX ADMIN — HALOPERIDOL 2 MG: 2 SOLUTION ORAL at 03:03

## 2024-05-13 RX ADMIN — PROPRANOLOL HYDROCHLORIDE 40 MG: 10 TABLET ORAL at 08:37

## 2024-05-13 RX ADMIN — DOXAZOSIN MESYLATE 1 MG: 1 TABLET ORAL at 08:32

## 2024-05-13 RX ADMIN — OMEPRAZOLE 20 MG: 20 CAPSULE, DELAYED RELEASE ORAL at 08:32

## 2024-05-13 RX ADMIN — ACETAMINOPHEN 650 MG: 325 TABLET ORAL at 14:24

## 2024-05-13 RX ADMIN — LORAZEPAM 1 MG: 2 LIQUID ORAL at 19:48

## 2024-05-13 RX ADMIN — AMLODIPINE BESYLATE 5 MG: 5 TABLET ORAL at 08:33

## 2024-05-13 RX ADMIN — LISINOPRIL 40 MG: 20 TABLET ORAL at 08:32

## 2024-05-13 RX ADMIN — FLUOXETINE HYDROCHLORIDE 40 MG: 20 CAPSULE ORAL at 08:32

## 2024-05-13 ASSESSMENT — COGNITIVE AND FUNCTIONAL STATUS - GENERAL
CLIMB 3 TO 5 STEPS WITH RAILING: TOTAL
DAILY ACTIVITIY SCORE: 6
PERSONAL GROOMING: TOTAL
TOILETING: TOTAL
TURNING FROM BACK TO SIDE WHILE IN FLAT BAD: A LOT
SUGGESTED CMS G CODE MODIFIER DAILY ACTIVITY: CN
DRESSING REGULAR LOWER BODY CLOTHING: TOTAL
WALKING IN HOSPITAL ROOM: TOTAL
MOVING FROM LYING ON BACK TO SITTING ON SIDE OF FLAT BED: TOTAL
MOVING TO AND FROM BED TO CHAIR: TOTAL
MOBILITY SCORE: 7
STANDING UP FROM CHAIR USING ARMS: TOTAL
DRESSING REGULAR UPPER BODY CLOTHING: TOTAL
EATING MEALS: TOTAL
SUGGESTED CMS G CODE MODIFIER MOBILITY: CM
HELP NEEDED FOR BATHING: TOTAL

## 2024-05-13 NOTE — ED NOTES
"Pt cleaned. Repositioned on [facility]. Attempted to get pt to take his pills. Only took some. Pt refusing to eat \"that is shit\"   "

## 2024-05-13 NOTE — DISCHARGE PLANNING
MONIKA consulted by RN as [facility] was here filling out paperwork for pt to go to their residence.  SW went to retrieve paperwork to assist in filling it out.  Upon returning to work station, MONIKA received call from Jr FRANK at [facility]: [phone].  Cristiane herrera [facility] meeting was supposed to be canceled as pt has been accepted by [facility] and will be able to transport there tomorrow.  MONIKA met with [facility] Representative and explained misunderstanding.

## 2024-05-13 NOTE — ED NOTES
Bedside report from MARISA Ackerman. Pt resting in [facility] comfortably, on monitor, call light in reach.  Pt is on RA, GCS 14.  Pt cleaned and new brief placed on pt.  Necessary fall precautions in place.

## 2024-05-13 NOTE — ED NOTES
RN responded to bed exit alarm, patient found sitting at EOB attempting to exit, RN attempted to reorient patient, patient began kicking RN and slamming call light into side of bed. ERP called to assist and observe patient behavior, ERP also attempted to reorient patient, patient used multiple curse words towards staff and continued to be violent. Patient placed in posey vest and repositioned in bed. Bed exit alarm back on, bed in lowest position, charge RN notified of patient.

## 2024-05-13 NOTE — ED NOTES
Pt resting in hospital bed with even and unlabored chest rise and fall, awakes easily to stimuli. Family at bedside, bed alarms on and in place. Care will continue as ordered.

## 2024-05-13 NOTE — DISCHARGE PLANNING
SW called [facility] to ask if there was anything else they needed,  staff stated they did not need anything and it appears that everything is set up for family to pick pt up tomorrow and take him to  around 1pm.

## 2024-05-13 NOTE — ED NOTES
Pt attempting to move around in bed causing bed alarm to go off. Pt reoriented and placed back in bed.

## 2024-05-13 NOTE — WOUND TEAM
[facility]  Inpatient Services  Wound and Skin Care Brief Evaluation    Admission Date: 5/10/2024     Last order of IP CONSULT TO WOUND CARE was found on 5/12/2024 from Hospital Encounter on 5/10/2024     HPI, PMH, SH: Reviewed    Chief Complaint   Patient presents with    Other     [facility] from home for failure to thrive. [facility], nondisplaced fracture of the lateral malleolus with soft tissue swelling. Wife was told by [facility] that someone will follow up them but no follow up happened. Wife unable to care for patient.  Bruising noted on lateral left ankle and lower leg. Pulse intact. Dementia, Aox0 at baseline. DNR with selective treatment. Did not come with original POLST. Saturating at 88% on RA, placed on 3 LPM.     Diagnosis: No admission diagnoses are documented for this encounter.    Unit where seen by Wound Team: GR 39/39 GRN     Wound consult placed regarding Sacrum. Chart and images reviewed.. This clinician in to assess patient. Patient pleasant and agreeable. Sacrum and bilateral heels. Non-selectively debrided with Moist warm washcloth.     No pressure injuries or advanced wound care needs identified. Wound consult completed. No further follow up unless indicated and consulted.     Moisture Associated Skin Damage 05/13/24 Perineum;Buttock (Active)   First Observed Date/First Observed Time: 05/13/24 1437   Present on Original Admission: Yes  Wound Location : Perineum;Buttock      Assessments 5/13/2024  2:00 PM   Wound Image      NEXT Weekly Photo (Inpatient Only) 05/20/24   Drainage Amount None   Periwound Assessment Red   IAD Cleansing Dimethecone Wipes   Periwound Protectant Barrier Paste   IAD Containment Device Condom Catheter   WOUND NURSE ONLY - Time Spent with Patient (mins) 45               Unable to assess L. Heel   PREVENTATIVE INTERVENTIONS:    Q shift Tylor - performed per nursing policy  Q shift pressure point assessments - performed per  nursing policy    Surface/Positioning  Standard/trauma mattress - Currently in Place  TAPs Turning system - Currently in Place  Waffle overlay  - Currently in Place    Offloading/Redistribution  Heel offloading dressing (Silicone dressing) - Ordered      Respiratory  Silicone O2 tubing - Currently in Place  Gray Foam Ear protectors - Currently in Place    Containment/Moisture Prevention    Purwick/Condom Cath - Currently in Place  Diapered - Currently in Place  Barrier paste - Currently in Place

## 2024-05-13 NOTE — PROGRESS NOTES
"ED Observation Progress Note    Date of Service: 05/13/24    Interval History and Interventions  The patient continues to hold in the emergency department awaiting placement at a care facility.  There is been no cute issues.  I have asked the hospitalist service to consult regarding ongoing care while the patient remains in the emergency department.  Final disposition pending.  No acute issues or events overnight.    Physical Exam  BP 92/52   Pulse (!) 53   Temp 36.4 °C (97.5 °F) (Skin)   Resp 16   Ht 1.753 m (5' 9\")   Wt 72.6 kg (160 lb)   SpO2 97%   BMI 23.63 kg/m² .    Constitutional: Awake and alert. Nontoxic  HENT:  Grossly normal  Eyes: Grossly normal  Neck: Normal range of motion  Cardiovascular: Normal heart rate   Thorax & Lungs: No respiratory distress  Abdomen: Nontender  Skin:  No pathologic rash.   Extremities: Well perfused  Psychiatric: Affect normal    Labs  Results for orders placed or performed during the hospital encounter of 05/10/24   Quantiferon Gold Plus TB (4 tube)   Result Value Ref Range    QFT NIL 0.06 IU/mL    QFT TB1 - NIL 0.00 <=0.34 IU/mL    QFT TB2 - NIL 0.00 <=0.34 IU/mL    QFT Mitogen - NIL >10.00 IU/mL    QFT Gold Plus Negative Negative   POC CoV-2, FLU A/B, RSV by PCR   Result Value Ref Range    POC Influenza A RNA, PCR Negative Negative    POC Influenza B RNA, PCR Negative Negative    POC RSV, by PCR Negative Negative    POC SARS-CoV-2, PCR NotDetected    POCT glucose device results   Result Value Ref Range    POC Glucose, Blood 124 (H) 65 - 99 mg/dL       Radiology  No orders to display       Problem List  1. Other ascites    2. Hospice care patient    3. Cirrhosis of liver with ascites, unspecified hepatic cirrhosis type (HCC)    4. Closed fracture of left ankle, initial encounter    5. Severe dementia, unspecified dementia type, unspecified whether behavioral, psychotic, or mood disturbance or anxiety (HCC)          Electronically signed by: Eric Jacobson M.D., " 5/13/2024 1:54 PM

## 2024-05-13 NOTE — ED NOTES
Bedside report received from off going RN/tech: Shellie, assumed care of patient.  POC discussed with patient. Call light within reach, all needs addressed at this time.       Fall risk interventions in place: Give patient urinal if applicable, Bed Alarm in use, and Restraint vest (all applicable per [place] Fall risk assessment)   Continuous monitoring: Pulse Ox  IVF/IV medications: Not Applicable   Oxygen: Room Air  Bedside sitter: Not Applicable   Isolation: Not Applicable

## 2024-05-13 NOTE — ED NOTES
Pt pulled on brief and urinated in bed, sheet change provided, condom cath placed on pt with brief. Pt on monitor, nc placed on pt.  Pt continued in vest restraint for safety. Bed alarm on.

## 2024-05-13 NOTE — ED NOTES
Pt sleeping, airway patent, rr even and unlabored. no new complaints or distress noted at this time. Rails up times two, call light within reach. Bed alarm on.

## 2024-05-13 NOTE — ED NOTES
Rounded on patient, patient remains in position of safety with vest restraint applied, eyes closed, visible chest rise and fall, remains on continuous pulse oximeter monitoring with bed alarm active. No apparent distress, fall precautions in place, call light within reach.

## 2024-05-13 NOTE — CONSULTS
Hospital Medicine Consultation    Date of Service  5/13/2024    Referring Physician  Eric Jacobson M.D.    Consulting Physician  Isabel Mcguire M.D.    Reason for Consultation  Medical Management    History of Presenting Illness  Patient is unable to provide history. I also called his wife ([phone]), his son was later present by the bedside and also [facility] hospice.    79 y.o. male who has Vascular dementia with Behavior following with [facility] hospice since 4/21/2024.  He also has history of Type II DM, Liver Cirrhosis/ esophageal varices, GERD, Depression and Afib (off anticoagulation), who has been in the ED since 5/10/2024 for worsening weakness.  Patient has been mostly bedbound and unable to ambulate since 4/30 due to fall resulting on nondisplaced fracture to the lateral left malleolus with soft tissue swelling.  Wife states that she is no longer able to care for him. On Hospice since 4/21.        Review of Systems  Patient is not able to provide history or review of system.    Past Medical History  Vascular dementia with Behavior , Type II DM, Liver Cirrhosis/ esophageal varices, GERD, Depression and Afib (off anticoagulation)    Surgical History  No recent surgery history    Family History  Reviewed and not pertinent    Social History   Denies tobacco smoking, no alcohol use or recreational drug use    Medications  Prior to Admission Medications   Prescriptions Last Dose Informant Patient Reported? Taking?   ARIPiprazole (ABILIFY) 2 MG tablet 5/9/2024 at AM Significant Other Yes Yes   Sig: Take 2 mg by mouth every day.   Alogliptin Benzoate 12.5 MG Tab 5/6/2024 at PM Significant Other Yes Yes   Sig: Take 12.5 mg by mouth 2 times a day.   LORazepam (LORAZEPAM INTENSOL) 2 MG/ML Conc 5/9/2024 at 2015 Significant Other Yes Yes   Sig: Take 0.5 mg by mouth every 2 hours as needed (anxiety). 0.25ml= 0.5mg   amLODIPine (NORVASC) 5 MG Tab 5/6/2024 at PM Significant Other Yes Yes   Sig: Take 5 mg  by mouth every day.   doxazosin (CARDURA) 1 MG Tab 5/9/2024 at AM Significant Other Yes Yes   Sig: Take 1 mg by mouth every day.   finasteride (PROSCAR) 5 MG Tab 5/9/2024 at AM Significant Other Yes Yes   Sig: Take 5 mg by mouth every day.   fluoxetine (PROZAC) 40 MG capsule 5/6/2024 at AM Significant Other Yes Yes   Sig: Take 40 mg by mouth every day.   glimepiride (AMARYL) 1 MG tablet 5/6/2024 at AM Significant Other Yes Yes   Sig: Take 1 mg by mouth every morning.   lisinopril (PRINIVIL) 40 MG tablet 5/6/2024 at AM Significant Other Yes Yes   Sig: Take 40 mg by mouth every morning.   loperamide (IMODIUM) 2 MG Cap UNK at PRN Significant Other Yes Yes   Sig: Take 2 mg by mouth 4 times a day as needed for Diarrhea.   omeprazole (PRILOSEC) 20 MG delayed-release capsule 5/6/2024 at AM Significant Other Yes Yes   Sig: Take 20 mg by mouth every morning.   propranolol (INDERAL) 40 MG Tab 5/6/2024 at PM Significant Other Yes Yes   Sig: Take 40 mg by mouth 2 times a day. 1/2 tab = 20mg   spironolactone (ALDACTONE) 50 MG Tab 5/9/2024 at AM Significant Other Yes Yes   Sig: Take 50 mg by mouth every day.      Facility-Administered Medications: None       Allergies  Allergies   Allergen Reactions    Morphine Unspecified     hallucinations    Tramadol Unspecified     hallucinations       Physical Exam  Temp:  [36.4 °C (97.5 °F)-36.7 °C (98 °F)] 36.4 °C (97.5 °F)  Pulse:  [54-82] 55  Resp:  [16-18] 16  BP: ()/(54-81) 95/64  SpO2:  [88 %-98 %] 88 %    Physical Exam  Constitutional:       Appearance: Normal appearance.   HENT:      Head: Normocephalic and atraumatic.      Nose: Nose normal.      Mouth/Throat:      Mouth: Mucous membranes are moist.      Pharynx: Oropharynx is clear.   Eyes:      Extraocular Movements: Extraocular movements intact.      Pupils: Pupils are equal, round, and reactive to light.   Cardiovascular:      Rate and Rhythm: Normal rate and regular rhythm.      Pulses: Normal pulses.      Heart sounds:  Normal heart sounds.   Pulmonary:      Effort: Pulmonary effort is normal.      Breath sounds: Normal breath sounds.   Abdominal:      General: Abdomen is flat. Bowel sounds are normal.      Palpations: Abdomen is soft.   Musculoskeletal:      Cervical back: Normal range of motion and neck supple.      Comments: Left lower extremity is immobilized   Skin:     General: Skin is warm and dry.   Neurological:      General: No focal deficit present.      Mental Status: He is alert and oriented to person, place, and time.   Psychiatric:         Mood and Affect: Mood normal.         Behavior: Behavior normal.             Laboratory      Recent Labs     05/13/24  1507   SODIUM 135   POTASSIUM 5.0   CHLORIDE 107   CO2 20   GLUCOSE 146*   BUN 34*   CREATININE 1.44*   CALCIUM 10.2                   UA is positive for UTI    Imaging  DX-CHEST-PORTABLE (1 VIEW)   Final Result      BILATERAL atelectasis with possible superimposed pneumonia in the LEFT lower lobe          Assessment/Plan  Hypotension  Assessment & Plan  -Blood pressure was 90 over 50s.  Family agreeable to receive 1 dose of 500 mL bolus  -Holding all his antihypertensive medication.    ACP (advance care planning)  Assessment & Plan  -I had multiple conversations and family meeting about this patient.  I called his wife twice, talk to his son twice as well.  -Patient has been on hospice since late April.  He is currently bedbound after fall resulting in left malleoli are fracture.  -Patient has been in the ED awaiting for inpatient hospice placement now for 3 days.  -Family is a stating that they no longer want to provide any additional treatment including antibiotics now that he has a UTI and multifocal pneumonia.  -Patient already has established [facility] hospice.  I also talked to hospice nurse.  He has a bed assigned to him and should transfer to inpatient hospice tomorrow.  -Family is agreeable to have him under comfort care given they do not want to add any  medications or therapy at this time.  -Comfort care order set was added.  I confirmed CODE STATUS.  Total amount of time allocated on advance care planning conversations and multiple family meetings regarding goals of care was 35 minutes.    Multifocal pneumonia  Assessment & Plan  -Patient on hospice, transition to comfort care.  Family refusing both oral and IV antibiotics.    Functional quadriplegia (HCC)  Assessment & Plan  -Patient has inpatient hospice care established.  I discussed with , he apparently has a bed assigned for him for inpatient hospice tomorrow.    TATY (acute kidney injury) ([facility])  Assessment & Plan  -Patient received 500 mL of IV fluid bolus resuscitation but will no longer check his laboratory is him transitioning him to comfort care.    Normocytic anemia  Assessment & Plan  -Initial hemoglobin 12.2, switching to comfort care.    Acute UTI  Assessment & Plan  -Wife requested to check for laboratory and UA.  Patient does have UTI but family does not want and is refusing IV antibiotics.  I also offer oral antibiotics for which they refused.  Comfort care order set is currently in place.    Thrombocytopenia ([facility])  Assessment & Plan  -Platelets is 130.  Will no longer check CBC as he is now on comfort care.    Vascular dementia ([facility])  Assessment & Plan  -Patient has history of vascular dementia.  He is a started on hospice late April.  Patient was taking Abilify and Prozac, currently transition to comfort care as above.

## 2024-05-13 NOTE — ED PROVIDER NOTES
I assumed care of this patient at shift change.  He was seen and evaluated by Dr. Heard, hospitalist.  He did have some hypotension in the emergency department, this is treated with IV fluids.  Please see Dr. Heard's consult note for full history.  Family indicates that they no longer want to provide any additional treatment for this patient including antibiotics for urinary tract infection and multifocal pneumonia.  He remains under comfort care at this time.  CODE STATUS was confirmed.  He is established at [facility], has a bed assigned, and should transfer to inpatient hospice tomorrow.

## 2024-05-13 NOTE — ED NOTES
Pt cleansed and changed into new diaper. Urine noted. Redness to left buttocks noted. Mepilex placed.

## 2024-05-13 NOTE — ED NOTES
Pt on side of bed, trying to get out of bed. Pt aggressive towards staff but able to get him back into bed.

## 2024-05-13 NOTE — ED NOTES
Bedside report received from off going RN/tech: Paris CUNNINGHAM, assumed care of patient. Patient aox1, attempting to hit and kick staff, patient moved into safer position, medicated per mar, verified safety precautions in place. Call light within reach.     Fall risk interventions in place: Move the patient closer to the nurse's station, Patient's personal possessions are with in their safe reach, Place socks on patient, Place fall risk sign on patient's door, Give patient urinal if applicable, Keep floor surfaces clean and dry, Accompanied to restroom, and Bed Alarm in use (all applicable per [place] Fall risk assessment)   Continuous monitoring: Not Applicable   IVF/IV medications: Not Applicable   Oxygen: Room Air  Bedside sitter: Not Applicable   Isolation: Not Applicable

## 2024-05-14 VITALS
BODY MASS INDEX: 23.7 KG/M2 | RESPIRATION RATE: 16 BRPM | HEART RATE: 58 BPM | TEMPERATURE: 97 F | SYSTOLIC BLOOD PRESSURE: 99 MMHG | DIASTOLIC BLOOD PRESSURE: 54 MMHG | HEIGHT: 69 IN | WEIGHT: 160 LBS | OXYGEN SATURATION: 91 %

## 2024-05-14 PROCEDURE — 86480 TB TEST CELL IMMUN MEASURE: CPT

## 2024-05-14 NOTE — ED NOTES
Spoke with pt's family and hospitalist, family requesting no PIV or invasive medications/treatment.  Pt resting in hospital bed, restraint vest in place. On monitor, call light in place and bed alarms on.

## 2024-05-14 NOTE — ED NOTES
Family at bedside requesting to transport pt to [facility] due to the facility wanting pt there by 1300. Explained to pt family that we had the Quantiferon test in process and SW was working on the case.  SW made aware.

## 2024-05-14 NOTE — ASSESSMENT & PLAN NOTE
-Blood pressure was 90 over 50s.  Family agreeable to receive 1 dose of 500 mL bolus  -Holding all his antihypertensive medication.

## 2024-05-14 NOTE — ASSESSMENT & PLAN NOTE
-Wife requested to check for laboratory and UA.  Patient does have UTI but family does not want and is refusing IV antibiotics.  I also offer oral antibiotics for which they refused.  Comfort care order set is currently in place.

## 2024-05-14 NOTE — ED PROVIDER NOTES
"ED Observation Progress Note    Date of Service: 05/14/24    Interval History  No issues overnight. Pt is on comfort care.     Physical Exam  /58   Pulse (!) 58   Temp 36.4 °C (97.5 °F) (Skin)   Resp 17   Ht 1.753 m (5' 9\")   Wt 72.6 kg (160 lb)   SpO2 91%   BMI 23.63 kg/m² .    Constitutional: Awake and alert. Nontoxic  HENT:  Grossly normal  Eyes: Grossly normal  Neck: Normal range of motion  Cardiovascular: Normal heart rate   Thorax & Lungs: No respiratory distress  Abdomen: Nontender  Skin:  No pathologic rash.   Extremities: Well perfused  Psychiatric: Affect normal    Labs  Results for orders placed or performed during the hospital encounter of 05/10/24   Quantiferon Gold Plus TB (4 tube)   Result Value Ref Range    QFT NIL 0.06 IU/mL    QFT TB1 - NIL 0.00 <=0.34 IU/mL    QFT TB2 - NIL 0.00 <=0.34 IU/mL    QFT Mitogen - NIL >10.00 IU/mL    QFT Gold Plus Negative Negative   CBC WITH DIFFERENTIAL   Result Value Ref Range    WBC 8.4 4.8 - 10.8 K/uL    RBC 3.86 (L) 4.70 - 6.10 M/uL    Hemoglobin 12.2 (L) 14.0 - 18.0 g/dL    Hematocrit 36.2 (L) 42.0 - 52.0 %    MCV 93.8 81.4 - 97.8 fL    MCH 31.6 27.0 - 33.0 pg    MCHC 33.7 32.3 - 36.5 g/dL    RDW 62.6 (H) 35.9 - 50.0 fL    Platelet Count 130 (L) 164 - 446 K/uL    MPV 10.5 9.0 - 12.9 fL    Neutrophils-Polys 73.60 (H) 44.00 - 72.00 %    Lymphocytes 17.60 (L) 22.00 - 41.00 %    Monocytes 6.60 0.00 - 13.40 %    Eosinophils 1.30 0.00 - 6.90 %    Basophils 0.50 0.00 - 1.80 %    Immature Granulocytes 0.40 0.00 - 0.90 %    Nucleated RBC 0.00 0.00 - 0.20 /100 WBC    Neutrophils (Absolute) 6.15 1.82 - 7.42 K/uL    Lymphs (Absolute) 1.47 1.00 - 4.80 K/uL    Monos (Absolute) 0.55 0.00 - 0.85 K/uL    Eos (Absolute) 0.11 0.00 - 0.51 K/uL    Baso (Absolute) 0.04 0.00 - 0.12 K/uL    Immature Granulocytes (abs) 0.03 0.00 - 0.11 K/uL    NRBC (Absolute) 0.00 K/uL   URINALYSIS    Specimen: Urine, Cath   Result Value Ref Range    Color Yellow     Character Cloudy (A)     " Specific Gravity 1.018 <1.035    Ph 6.0 5.0 - 8.0    Glucose Negative Negative mg/dL    Ketones Negative Negative mg/dL    Protein Negative Negative mg/dL    Bilirubin Negative Negative    Urobilinogen, Urine 1.0 Negative    Nitrite Positive (A) Negative    Leukocyte Esterase Large (A) Negative    Occult Blood Moderate (A) Negative    Micro Urine Req Microscopic    Comp Metabolic Panel   Result Value Ref Range    Sodium 135 135 - 145 mmol/L    Potassium 5.0 3.6 - 5.5 mmol/L    Chloride 107 96 - 112 mmol/L    Co2 20 20 - 33 mmol/L    Anion Gap 8.0 7.0 - 16.0    Glucose 146 (H) 65 - 99 mg/dL    Bun 34 (H) 8 - 22 mg/dL    Creatinine 1.44 (H) 0.50 - 1.40 mg/dL    Calcium 10.2 8.5 - 10.5 mg/dL    Correct Calcium 11.5 (H) 8.5 - 10.5 mg/dL    AST(SGOT) 66 (H) 12 - 45 U/L    ALT(SGPT) 14 2 - 50 U/L    Alkaline Phosphatase 142 (H) 30 - 99 U/L    Total Bilirubin 1.2 0.1 - 1.5 mg/dL    Albumin 2.4 (L) 3.2 - 4.9 g/dL    Total Protein 4.9 (L) 6.0 - 8.2 g/dL    Globulin 2.5 1.9 - 3.5 g/dL    A-G Ratio 1.0 g/dL   CoV-2, Flu A/B, And RSV by PCR ([employer])    Specimen: Nasal; Respirate   Result Value Ref Range    Influenza virus A RNA Negative Negative    Influenza virus B, PCR Negative Negative    RSV, PCR Negative Negative    SARS-CoV-2 by PCR NotDetected     SARS-CoV-2 Source NP Swab    URINE MICROSCOPIC (W/UA)   Result Value Ref Range    -150 (A) /hpf    RBC 5-10 (A) /hpf    Bacteria Many (A) None /hpf    Epithelial Cells Negative /hpf    Hyaline Cast 0-2 /lpf   ESTIMATED GFR   Result Value Ref Range    GFR (CKD-EPI) 49 (A) >60 mL/min/1.73 m 2   POC CoV-2, FLU A/B, RSV by PCR   Result Value Ref Range    POC Influenza A RNA, PCR Negative Negative    POC Influenza B RNA, PCR Negative Negative    POC RSV, by PCR Negative Negative    POC SARS-CoV-2, PCR NotDetected    POCT glucose device results   Result Value Ref Range    POC Glucose, Blood 124 (H) 65 - 99 mg/dL       Radiology  DX-CHEST-PORTABLE (1 VIEW)   Final Result       BILATERAL atelectasis with possible superimposed pneumonia in the LEFT lower lobe          Problem List  1. Other ascites        2. Hospice care patient        3. Cirrhosis of liver with ascites, unspecified hepatic cirrhosis type (HCC)        4. Closed fracture of left ankle, initial encounter        5. Severe dementia, unspecified dementia type, unspecified whether behavioral, psychotic, or mood disturbance or anxiety (HCC)              Electronically signed by: Jose Holly D.O., 5/14/2024 6:45 AM

## 2024-05-14 NOTE — ASSESSMENT & PLAN NOTE
-Patient has inpatient hospice care established.  I discussed with , he apparently has a bed assigned for him for inpatient hospice tomorrow.

## 2024-05-14 NOTE — ED NOTES
Patient restless - assisted with repositioning and patient now resting comfortably, resp even and unlabored, NAD, and no needs at this time.  Fall safety precautions in place per policy.

## 2024-05-14 NOTE — ED NOTES
Bedside report received from off going RN: Maryam. Assumed care of patient.  POC discussed with patient. Call light within reach, all needs addressed at this time.       Fall risk interventions in place: Move the patient closer to the nurse's station, Patient's personal possessions are with in their safe reach, Place socks on patient, Place fall risk sign on patient's door, Keep floor surfaces clean and dry, Bed Alarm in use, and Restraint vest   Continuous monitoring: Cardiac Leads, Pulse Ox, or Blood Pressure  IVF/IV medications: Not Applicable   Oxygen: Room Air  Bedside sitter: Not Applicable   Isolation: Not Applicable

## 2024-05-14 NOTE — ED NOTES
Pt pulling at monitoring equipment, BP and Pulse ox placed on leg and toe for continued monitoring

## 2024-05-14 NOTE — DISCHARGE PLANNING
MONIKA notified family at bedside and ready to discharge with Pt. RN inquiring about transfer paperwork.     MONIKA met with family at bedside. Per family they were told by [facility] Pt could not transfer by medical transport to [facility] so they are here to take him by Private Vehicle. Pt DX of Vascular Dementia, Functional Quadriplegia, comfort care and is Bed Bound. Family unsure about getting Pt in to their vehicle and making him comfortable for ride to [facility].   MONIKA discussed transport options with [facility] and [employer]. There is an uncertainty if transport would be covered by Medicare. MONIKA contacted [facility] and they can provide [facility] transport for $545.00. Family in agreement to pay this cost.     MONIKA arranged with Natalio at [facility] to Transport Pt. Pt wife will call with Credit Card number for payment. Transportation time arranged for [id number].   Family and RN updated.     MONIKA called [facility] and spoke to  Cristiane. Cristiane in agreement for Pt to transport by Medical Transport. Per Cristiane they do not need any medications sent with Pt. And no paper prescriptions will be needed for discharge as the Hospice RN will meet them at [facility]. Cristiane aware of delayed transportation time.     Monika called [facility] and spoke to Yenni. Per Yenni they do not need additional paperwork just the transfer packet and she is aware of the delayed transportation time.     JANIS completed Med Rec , DC Summary, and signed COBRA.   MONIKA printed transfer packet and gave to RN for transportation company.

## 2024-05-14 NOTE — ASSESSMENT & PLAN NOTE
-I had multiple conversations and family meeting about this patient.  I called his wife twice, talk to his son twice as well.  -Patient has been on hospice since late April.  He is currently bedbound after fall resulting in left malleoli are fracture.  -Patient has been in the ED awaiting for inpatient hospice placement now for 3 days.  -Family is a stating that they no longer want to provide any additional treatment including antibiotics now that he has a UTI and multifocal pneumonia.  -Patient already has established [facility] hospice.  I also talked to hospice nurse.  He has a bed assigned to him and should transfer to inpatient hospice tomorrow.  -Family is agreeable to have him under comfort care given they do not want to add any medications or therapy at this time.  -Comfort care order set was added.  I confirmed CODE STATUS.  Total amount of time allocated on advance care planning conversations and multiple family meetings regarding goals of care was 35 minutes.

## 2024-05-14 NOTE — ED NOTES
"Bedside report received from off going RN/tech: Kimmy, assumed care of patient.  POC discussed with patient. Call light within reach, all needs addressed at this time.       Fall risk interventions in place: Move the patient closer to the nurse's station, Patient's personal possessions are with in their safe reach, Place socks on patient, Place fall risk sign on patient's door, Give patient urinal if applicable, Keep floor surfaces clean and dry, and Bed Alarm in use (all applicable per [place] Fall risk assessment)   Continuous monitoring: Pulse Ox or Blood Pressure  IVF/IV medications: Not Applicable   Oxygen: Room Air  Bedside sitter: Not Applicable   Isolation: Not Applicable    BP 98/47   Pulse (!) 49   Temp 36.4 °C (97.5 °F) (Skin)   Resp 18   Ht 1.753 m (5' 9\")   Wt 72.6 kg (160 lb)   SpO2 88%  - RA, patient has oxygen available but refuses to wear it  "

## 2024-05-14 NOTE — ASSESSMENT & PLAN NOTE
-Patient has history of vascular dementia.  He is a started on hospice late April.  Patient was taking Abilify and Prozac, currently transition to comfort care as above.

## 2024-05-14 NOTE — ASSESSMENT & PLAN NOTE
-Patient received 500 mL of IV fluid bolus resuscitation but will no longer check his laboratory is him transitioning him to comfort care.

## 2024-05-14 NOTE — ED NOTES
[facility] at bedside. Pt transported via [facility] with all belongings.   Report to MARISA Gerardo.

## 2024-05-14 NOTE — ED NOTES
Pt resting in bed comfortably, restraint vest in place, pt on monitor, call light in reach. Bed alarms on.

## 2024-05-14 NOTE — ED NOTES
Patient again restless - assisted with repositioning and patient now resting comfortably, resp even and unlabored, NAD, and no needs at this time.  Condom catheter remains in place.  Fall safety precautions in place per policy.

## 2024-05-15 LAB
GAMMA INTERFERON BACKGROUND BLD IA-ACNC: 0.04 IU/ML
M TB IFN-G BLD-IMP: NEGATIVE
M TB IFN-G CD4+ BCKGRND COR BLD-ACNC: 0.01 IU/ML
MITOGEN IGNF BCKGRD COR BLD-ACNC: >10 IU/ML
QFT TB2 - NIL TBQ2: 0 IU/ML